# Patient Record
Sex: FEMALE | Race: WHITE | NOT HISPANIC OR LATINO | URBAN - METROPOLITAN AREA
[De-identification: names, ages, dates, MRNs, and addresses within clinical notes are randomized per-mention and may not be internally consistent; named-entity substitution may affect disease eponyms.]

---

## 2023-10-05 ENCOUNTER — INPATIENT (INPATIENT)
Facility: HOSPITAL | Age: 33
LOS: 2 days | Discharge: HOME | DRG: 694 | End: 2023-10-08
Attending: HOSPITALIST | Admitting: STUDENT IN AN ORGANIZED HEALTH CARE EDUCATION/TRAINING PROGRAM
Payer: MEDICAID

## 2023-10-05 VITALS
SYSTOLIC BLOOD PRESSURE: 126 MMHG | HEART RATE: 80 BPM | OXYGEN SATURATION: 97 % | DIASTOLIC BLOOD PRESSURE: 89 MMHG | TEMPERATURE: 98 F | RESPIRATION RATE: 16 BRPM | HEIGHT: 65 IN | WEIGHT: 160.06 LBS

## 2023-10-05 DIAGNOSIS — Z90.3 ACQUIRED ABSENCE OF STOMACH [PART OF]: Chronic | ICD-10-CM

## 2023-10-05 LAB
ALBUMIN SERPL ELPH-MCNC: 4.4 G/DL — SIGNIFICANT CHANGE UP (ref 3.3–5)
ALP SERPL-CCNC: 56 U/L — SIGNIFICANT CHANGE UP (ref 40–120)
ALT FLD-CCNC: 14 U/L — SIGNIFICANT CHANGE UP (ref 10–45)
ANION GAP SERPL CALC-SCNC: 25 MMOL/L — HIGH (ref 5–17)
ANION GAP SERPL CALC-SCNC: 27 MMOL/L — HIGH (ref 5–17)
AST SERPL-CCNC: 28 U/L — SIGNIFICANT CHANGE UP (ref 10–40)
BASE EXCESS BLDV CALC-SCNC: -1.5 MMOL/L — SIGNIFICANT CHANGE UP (ref -2–3)
BASOPHILS # BLD AUTO: 0.07 K/UL — SIGNIFICANT CHANGE UP (ref 0–0.2)
BASOPHILS NFR BLD AUTO: 0.9 % — SIGNIFICANT CHANGE UP (ref 0–2)
BILIRUB SERPL-MCNC: 0.6 MG/DL — SIGNIFICANT CHANGE UP (ref 0.2–1.2)
BUN SERPL-MCNC: 13 MG/DL — SIGNIFICANT CHANGE UP (ref 7–23)
BUN SERPL-MCNC: 14 MG/DL — SIGNIFICANT CHANGE UP (ref 7–23)
CA-I SERPL-SCNC: 1.1 MMOL/L — LOW (ref 1.15–1.33)
CALCIUM SERPL-MCNC: 10.1 MG/DL — SIGNIFICANT CHANGE UP (ref 8.4–10.5)
CALCIUM SERPL-MCNC: 9.9 MG/DL — SIGNIFICANT CHANGE UP (ref 8.4–10.5)
CHLORIDE BLDV-SCNC: 95 MMOL/L — LOW (ref 96–108)
CHLORIDE SERPL-SCNC: 95 MMOL/L — LOW (ref 96–108)
CHLORIDE SERPL-SCNC: 96 MMOL/L — SIGNIFICANT CHANGE UP (ref 96–108)
CO2 BLDV-SCNC: 21 MMOL/L — LOW (ref 22–26)
CO2 SERPL-SCNC: 17 MMOL/L — LOW (ref 22–31)
CREAT SERPL-MCNC: 0.74 MG/DL — SIGNIFICANT CHANGE UP (ref 0.5–1.3)
CREAT SERPL-MCNC: 1 MG/DL — SIGNIFICANT CHANGE UP (ref 0.5–1.3)
EGFR: 111 ML/MIN/1.73M2 — SIGNIFICANT CHANGE UP
EGFR: 77 ML/MIN/1.73M2 — SIGNIFICANT CHANGE UP
EOSINOPHIL # BLD AUTO: 0.01 K/UL — SIGNIFICANT CHANGE UP (ref 0–0.5)
EOSINOPHIL NFR BLD AUTO: 0.1 % — SIGNIFICANT CHANGE UP (ref 0–6)
GAS PNL BLDV: 134 MMOL/L — LOW (ref 136–145)
GAS PNL BLDV: SIGNIFICANT CHANGE UP
GLUCOSE BLDV-MCNC: 128 MG/DL — HIGH (ref 70–99)
GLUCOSE SERPL-MCNC: 120 MG/DL — HIGH (ref 70–99)
GLUCOSE SERPL-MCNC: 129 MG/DL — HIGH (ref 70–99)
HCG SERPL-ACNC: <2 MIU/ML — SIGNIFICANT CHANGE UP
HCO3 BLDV-SCNC: 20 MMOL/L — LOW (ref 22–29)
HCT VFR BLD CALC: 40.1 % — SIGNIFICANT CHANGE UP (ref 34.5–45)
HCT VFR BLDA CALC: 42 % — SIGNIFICANT CHANGE UP (ref 34.5–46.5)
HGB BLD CALC-MCNC: 14 G/DL — SIGNIFICANT CHANGE UP (ref 11.7–16.1)
HGB BLD-MCNC: 13.8 G/DL — SIGNIFICANT CHANGE UP (ref 11.5–15.5)
IMM GRANULOCYTES NFR BLD AUTO: 0.5 % — SIGNIFICANT CHANGE UP (ref 0–0.9)
LACTATE BLDV-MCNC: 2.2 MMOL/L — HIGH (ref 0.5–2)
LIDOCAIN IGE QN: 49 U/L — SIGNIFICANT CHANGE UP (ref 7–60)
LYMPHOCYTES # BLD AUTO: 0.69 K/UL — LOW (ref 1–3.3)
LYMPHOCYTES # BLD AUTO: 9 % — LOW (ref 13–44)
MCHC RBC-ENTMCNC: 30.4 PG — SIGNIFICANT CHANGE UP (ref 27–34)
MCHC RBC-ENTMCNC: 34.4 GM/DL — SIGNIFICANT CHANGE UP (ref 32–36)
MCV RBC AUTO: 88.3 FL — SIGNIFICANT CHANGE UP (ref 80–100)
MONOCYTES # BLD AUTO: 0.4 K/UL — SIGNIFICANT CHANGE UP (ref 0–0.9)
MONOCYTES NFR BLD AUTO: 5.2 % — SIGNIFICANT CHANGE UP (ref 2–14)
NEUTROPHILS # BLD AUTO: 6.47 K/UL — SIGNIFICANT CHANGE UP (ref 1.8–7.4)
NEUTROPHILS NFR BLD AUTO: 84.3 % — HIGH (ref 43–77)
NRBC # BLD: 0 /100 WBCS — SIGNIFICANT CHANGE UP (ref 0–0)
PCO2 BLDV: 25 MMHG — LOW (ref 39–42)
PH BLDV: 7.51 — HIGH (ref 7.32–7.43)
PLATELET # BLD AUTO: 277 K/UL — SIGNIFICANT CHANGE UP (ref 150–400)
PO2 BLDV: 36 MMHG — SIGNIFICANT CHANGE UP (ref 25–45)
POTASSIUM BLDV-SCNC: 4.3 MMOL/L — SIGNIFICANT CHANGE UP (ref 3.5–5.1)
POTASSIUM SERPL-MCNC: 3 MMOL/L — LOW (ref 3.5–5.3)
POTASSIUM SERPL-MCNC: 3.4 MMOL/L — LOW (ref 3.5–5.3)
POTASSIUM SERPL-SCNC: 3 MMOL/L — LOW (ref 3.5–5.3)
POTASSIUM SERPL-SCNC: 3.4 MMOL/L — LOW (ref 3.5–5.3)
PROT SERPL-MCNC: 6.9 G/DL — SIGNIFICANT CHANGE UP (ref 6–8.3)
RBC # BLD: 4.54 M/UL — SIGNIFICANT CHANGE UP (ref 3.8–5.2)
RBC # FLD: 13.1 % — SIGNIFICANT CHANGE UP (ref 10.3–14.5)
SAO2 % BLDV: 66.9 % — LOW (ref 67–88)
SODIUM SERPL-SCNC: 138 MMOL/L — SIGNIFICANT CHANGE UP (ref 135–145)
SODIUM SERPL-SCNC: 139 MMOL/L — SIGNIFICANT CHANGE UP (ref 135–145)
WBC # BLD: 7.68 K/UL — SIGNIFICANT CHANGE UP (ref 3.8–10.5)
WBC # FLD AUTO: 7.68 K/UL — SIGNIFICANT CHANGE UP (ref 3.8–10.5)

## 2023-10-05 PROCEDURE — 99285 EMERGENCY DEPT VISIT HI MDM: CPT

## 2023-10-05 PROCEDURE — 74177 CT ABD & PELVIS W/CONTRAST: CPT | Mod: 26,MA

## 2023-10-05 RX ORDER — ONDANSETRON 8 MG/1
4 TABLET, FILM COATED ORAL ONCE
Refills: 0 | Status: COMPLETED | OUTPATIENT
Start: 2023-10-05 | End: 2023-10-05

## 2023-10-05 RX ORDER — THIAMINE MONONITRATE (VIT B1) 100 MG
100 TABLET ORAL ONCE
Refills: 0 | Status: COMPLETED | OUTPATIENT
Start: 2023-10-05 | End: 2023-10-05

## 2023-10-05 RX ORDER — HYDROMORPHONE HYDROCHLORIDE 2 MG/ML
1 INJECTION INTRAMUSCULAR; INTRAVENOUS; SUBCUTANEOUS ONCE
Refills: 0 | Status: DISCONTINUED | OUTPATIENT
Start: 2023-10-05 | End: 2023-10-05

## 2023-10-05 RX ORDER — KETOROLAC TROMETHAMINE 30 MG/ML
15 SYRINGE (ML) INJECTION ONCE
Refills: 0 | Status: DISCONTINUED | OUTPATIENT
Start: 2023-10-05 | End: 2023-10-05

## 2023-10-05 RX ORDER — SODIUM CHLORIDE 9 MG/ML
1000 INJECTION, SOLUTION INTRAVENOUS
Refills: 0 | Status: DISCONTINUED | OUTPATIENT
Start: 2023-10-05 | End: 2023-10-08

## 2023-10-05 RX ORDER — SODIUM CHLORIDE 9 MG/ML
1000 INJECTION, SOLUTION INTRAVENOUS ONCE
Refills: 0 | Status: COMPLETED | OUTPATIENT
Start: 2023-10-05 | End: 2023-10-05

## 2023-10-05 RX ORDER — DEXTROSE 50 % IN WATER 50 %
25 SYRINGE (ML) INTRAVENOUS ONCE
Refills: 0 | Status: COMPLETED | OUTPATIENT
Start: 2023-10-05 | End: 2023-10-05

## 2023-10-05 RX ORDER — FOLIC ACID 0.8 MG
1 TABLET ORAL ONCE
Refills: 0 | Status: COMPLETED | OUTPATIENT
Start: 2023-10-05 | End: 2023-10-05

## 2023-10-05 RX ORDER — ACETAMINOPHEN 500 MG
1000 TABLET ORAL ONCE
Refills: 0 | Status: COMPLETED | OUTPATIENT
Start: 2023-10-05 | End: 2023-10-05

## 2023-10-05 RX ORDER — POTASSIUM CHLORIDE 20 MEQ
40 PACKET (EA) ORAL ONCE
Refills: 0 | Status: COMPLETED | OUTPATIENT
Start: 2023-10-05 | End: 2023-10-05

## 2023-10-05 RX ADMIN — HYDROMORPHONE HYDROCHLORIDE 1 MILLIGRAM(S): 2 INJECTION INTRAMUSCULAR; INTRAVENOUS; SUBCUTANEOUS at 22:22

## 2023-10-05 RX ADMIN — SODIUM CHLORIDE 4000 MILLILITER(S): 9 INJECTION, SOLUTION INTRAVENOUS at 21:14

## 2023-10-05 RX ADMIN — Medication 15 MILLIGRAM(S): at 21:11

## 2023-10-05 RX ADMIN — Medication 15 MILLIGRAM(S): at 21:04

## 2023-10-05 RX ADMIN — ONDANSETRON 4 MILLIGRAM(S): 8 TABLET, FILM COATED ORAL at 21:04

## 2023-10-05 RX ADMIN — ONDANSETRON 4 MILLIGRAM(S): 8 TABLET, FILM COATED ORAL at 17:43

## 2023-10-05 RX ADMIN — Medication 1000 MILLIGRAM(S): at 21:10

## 2023-10-05 RX ADMIN — Medication 100 MILLIGRAM(S): at 21:14

## 2023-10-05 RX ADMIN — Medication 1 MILLIGRAM(S): at 22:00

## 2023-10-05 RX ADMIN — Medication 400 MILLIGRAM(S): at 21:04

## 2023-10-05 RX ADMIN — HYDROMORPHONE HYDROCHLORIDE 1 MILLIGRAM(S): 2 INJECTION INTRAMUSCULAR; INTRAVENOUS; SUBCUTANEOUS at 23:11

## 2023-10-05 RX ADMIN — HYDROMORPHONE HYDROCHLORIDE 1 MILLIGRAM(S): 2 INJECTION INTRAMUSCULAR; INTRAVENOUS; SUBCUTANEOUS at 21:20

## 2023-10-05 RX ADMIN — HYDROMORPHONE HYDROCHLORIDE 1 MILLIGRAM(S): 2 INJECTION INTRAMUSCULAR; INTRAVENOUS; SUBCUTANEOUS at 21:41

## 2023-10-05 NOTE — ED ADULT TRIAGE NOTE - CHIEF COMPLAINT QUOTE
5 weeks had gastric sleeve and has left lower adam pain radiated to the abd area decreased urination

## 2023-10-05 NOTE — ED PROVIDER NOTE - OBJECTIVE STATEMENT
32-year-old female with past medical history of gastric sleeve 5 weeks ago at [place] presenting with left-sided abdominal pain  onset today.   Patient endorses some hematuria and this setting of current menstruation.  Small amounts NBNB emesis. No prior history of nephrolithiasis, no dysuria, no fevers and no chills.    NKDA 32-year-old female with past medical history of gastric sleeve 5 weeks ago at [place] presenting with left-sided abdominal pain  onset today.   Patient endorses some hematuria and this setting of current menstruation.  Small amounts NBNB emesis. No prior history of nephrolithiasis, no dysuria, no fevers and no chills.    NKDA 32-year-old female with past medical history of gastric sleeve 5 weeks ago at [place] presenting with left-sided abdominal pain  onset today.   Patient endorses some hematuria and this setting of current menstruation.  Small amounts NBNB emesis. No prior history of nephrolithiasis, no dysuria, no fevers and no chills.    NKDA

## 2023-10-05 NOTE — ED PROVIDER NOTE - RAPID ASSESSMENT
32-year-old female with past medical history of gastric sleeve 5 weeks ago at [place] presenting with abdominal pain.  Patient reports that she has had intermittent abdominal discomfort since the procedure and has not been tolerating p.o. well.  But over the past few days patient has developed severe left lower back pain radiating around to left lower quadrant and periumbilical area.  Abdominal pain has been associated with nausea and vomiting.  Patient unable to tolerate p.o.  Patient contacted her surgeon who instructed her to go to the ER.    **Patient was rapidly assessed by me, Terence Harp PA-C. A limited history was obtained. The patient will be seen and further examined/worked up in the main ED and their care will be completed by the main ED team. Receiving team will follow up on labs, analgesia, any clinical imaging, and perform reassessment and disposition of the patient as clinically indicated. All decisions regarding the progression of care will be made at their discretion. 32-year-old female with past medical history of gastric sleeve 5 weeks ago at [place] presenting with abdominal pain.  Patient reports that she has had intermittent abdominal discomfort since the procedure and has not been tolerating p.o. well.  But over the past few days patient has developed severe left lower back pain radiating around to left lower quadrant and periumbilical area.  Abdominal pain has been associated with nausea and vomiting.  Patient unable to tolerate p.o.  Patient contacted her surgeon who instructed her to go to the ER.    **Patient was rapidly assessed by me, Terence Harp PA-C. A limited history was obtained. The patient will be seen and further examined/worked up in the main ED and their care will be completed by the main ED team. Receiving team will follow up on labs, analgesia, any clinical imaging, and perform reassessment and disposition of the patient as clinically indicated. All decisions regarding the progression of care will be made at their discretion. 32-year-old female with past medical history of gastric sleeve 5 weeks ago at [place] presenting with abdominal pain.  Patient reports that she has had intermittent abdominal discomfort since the procedure and has not been tolerating p.o. well.  But over the past few days patient has developed severe left lower back pain radiating around to left lower quadrant and periumbilical area.  Abdominal pain has been associated with nausea and vomiting.  Patient unable to tolerate p.o.  Patient contacted her surgeon who instructed her to go to the ER.    **Patient was rapidly assessed by me, Terence Harp PA-C. A limited history was obtained. The patient will be seen and further examined/worked up in the main ED and their care will be completed by the main ED team. Receiving team will follow up on labs, analgesia, any clinical imaging, and perform reassessment and disposition of the patient as clinically indicated. All decisions regarding the progression of care will be made at their discretion. 32-year-old female with past medical history of gastric sleeve 5 weeks ago at [place] presenting with abdominal pain.  Patient reports that she has had intermittent abdominal discomfort since the procedure and has not been tolerating p.o. well.  But over the past few days patient has developed severe left lower back pain radiating around to left lower quadrant and periumbilical area.  Abdominal pain has been associated with nausea and vomiting.  Patient unable to tolerate p.o.  Patient contacted her surgeon who instructed her to go to the ER.    **Patient was rapidly assessed by me, Terence Harp PA-C. A limited history was obtained. The patient will be seen and further examined/worked up in the main ED and their care will be completed by the main ED team. Receiving team will follow up on labs, analgesia, any clinical imaging, and perform reassessment and disposition of the patient as clinically indicated. All decisions regarding the progression of care will be made at their discretion.    Attending MD Hernandez: This patient was seen and orders were placed by the PA as per our department's QPA model.  I was not consulted in regards to this patient although I was present and available in the Emergency Department to the PA.  Patient was to be sent to main ED for full medical evaluation and receiving team was to follow up on any labs, analgesia, clinical imaging ordered by the PA.  Any reassessment and disposition decisions were to be made by receiving team as clinically indicated, all decisions regarding the progression of care to be made at their discretion.  I did not perform a comprehensive history and physical on this patient. 32-year-old female with past medical history of gastric sleeve 5 weeks ago at [place] presenting with abdominal pain.  Patient reports that she has had intermittent abdominal discomfort since the procedure and has not been tolerating p.o. well.  But over the past few days patient has developed severe left lower back pain radiating around to left lower quadrant and periumbilical area.  Abdominal pain has been associated with nausea and vomiting.  Patient unable to tolerate p.o.  Patient contacted her surgeon who instructed her to go to the ER.    **Patient was rapidly assessed by me, Terence Harp PA-C. A limited history was obtained. The patient will be seen and further examined/worked up in the main ED and their care will be completed by the main ED team. Receiving team will follow up on labs, analgesia, any clinical imaging, and perform reassessment and disposition of the patient as clinically indicated. All decisions regarding the progression of care will be made at their discretion.    Attending MD Hernandez: This patient was seen and orders were placed by the PA as per our department's QPA model.  I was not consulted in regards to this patient although I was present and available in the Emergency Department to the PA.  Patient was to be sent to main ED for full medical evaluation and receiving team was to follow up on any labs, analgesia, clinical imaging ordered by the PA.  Any reassessment and disposition decisions were to be made by receiving team as clinically indicated, all decisions regarding the progression of care to be made at their discretion.  I did not perform a comprehensive history and physical on this patient. 32-year-old female with past medical history of gastric sleeve 5 weeks ago at [place] presenting with abdominal pain.  Patient reports that she has had intermittent abdominal discomfort since the procedure and has not been tolerating p.o. well.  But over the past few days patient has developed severe left lower back pain radiating around to left lower quadrant and periumbilical area.  Abdominal pain has been associated with nausea and vomiting.  Patient unable to tolerate p.o.  Patient contacted her surgeon who instructed her to go to the ER.    **Patient was rapidly assessed by me, Terence Harp PA-C. A limited history was obtained. The patient will be seen and further examined/worked up in the main ED and their care will be completed by the main ED team. Receiving team will follow up on labs, analgesia, any clinical imaging, and perform reassessment and disposition of the patient as clinically indicated. All decisions regarding the progression of care will be made at their discretion.    Attending MD Hernandez: This patient was seen and orders were placed by the PA as per our department's QPA model.  I was not consulted in regards to this patient although I was present and available in the Emergency Department to the PA.  Patient was to be sent to main ED for full medical evaluation and receiving team was to follow up on any labs, analgesia, clinical imaging ordered by the PA.  Any reassessment and disposition decisions were to be made by receiving team as clinically indicated, all decisions regarding the progression of care to be made at their discretion.  I did not perform a comprehensive history and physical on this patient.

## 2023-10-05 NOTE — CONSULT NOTE ADULT - SUBJECTIVE AND OBJECTIVE BOX
HISTORY OF PRESENT ILLNESS:  HPI: 32-year-old female with past medical history of gastric sleeve 5 weeks ago at [place] presenting with abdominal pain. Patient states she developed severe left lower back pain radiating around to left lower quadrant and periumbilical area, starting a few days ago. Pt endorses associated nausea and vomiting. Patient contacted her Bariatric surgeon who recommended she come to the ED for evaluation. Patient denies fever, chills, chest pain, SOB, dysuria. Patient has had decreased PO intake since the procedure.     In the ED, patient AF, HDS. Labs WNL. Bariatric surgery consulted for evaluation, given history of recent bariatric surgery.     PAST MEDICAL HISTORY: No pertinent past medical history    PAST SURGICAL HISTORY: H/O gastric sleeve    ALLERGIES: No Known Allergies      VITAL SIGNS:  T(C): 36.9 (05 Oct 2023 17:15), Max: 36.9 (05 Oct 2023 17:15)  T(F): 98.4 (05 Oct 2023 17:15), Max: 98.4 (05 Oct 2023 17:15)  HR: 80 (05 Oct 2023 17:15) (80 - 80)  BP: 126/89 (05 Oct 2023 17:15) (126/89 - 126/89)  BP(mean): --  ABP: --  ABP(mean): --  RR: 16 (05 Oct 2023 17:15) (16 - 16)  SpO2: 97% (05 Oct 2023 17:15) (97% - 97%)    O2 Parameters below as of 05 Oct 2023 17:15  Patient On (Oxygen Delivery Method): room air      PHYSICAL EXAM:  Gen: NAD  Neuro: A&Ox3  Resp: no increased WOB, satting well on RA  Cardiac: appears well perfused, extremities warm  Abd: soft, nondistended, incisions c/d/i   Back: L sided flank pain, tender to percussion     LABS:                         13.8   7.68  )-----------( 277      ( 05 Oct 2023 17:57 )             40.1     10-05    138  |  96  |  13  ----------------------------<  120<H>  3.0<L>   |  17<L>  |  1.00    Ca    9.9      05 Oct 2023 21:04    TPro  6.9  /  Alb  4.4  /  TBili  0.6  /  DBili  x   /  AST  28  /  ALT  14  /  AlkPhos  56  10-05      CAPILLARY BLOOD GLUCOSE        IMAGING:   < from: CT Abdomen and Pelvis w/ IV Cont (10.05.23 @ 20:12) >  FINDINGS:  LOWER CHEST: Within normal limits.    LIVER: Probable focal fat adjacent to the falciform ligament.  BILE DUCTS: Normal caliber.  GALLBLADDER: Within normal limits.  SPLEEN: Within normal limits.  PANCREAS: Within normal limits.  ADRENALS: Within normal limits.  KIDNEYS/URETERS: Moderate left hydroureteronephrosis with 3 mm stone at   the left ureterovesicular junction. Delayed enhancement of the left   kidney. No right hydronephrosis.    BLADDER: Within normal limits.  REPRODUCTIVE ORGANS: Uterus and adnexa within normal limits.    BOWEL: Small hiatal hernia. Sleeve gastrectomy. No bowel obstruction.   Appendix is normal.  PERITONEUM: No ascites.  VESSELS: Within normal limits.  RETROPERITONEUM/LYMPH NODES: No lymphadenopathy.  ABDOMINAL WALL: Within normal limits.  BONES: Within normal limits.    IMPRESSION:  Moderate left hydroureteronephrosis due to a 3 mm obstructing stone at   the left ureterovesicular junction.    < end of copied text >   HISTORY OF PRESENT ILLNESS:  HPI: 32-year-old female with past medical history of gastric sleeve 5 weeks ago at [place] presenting with abdominal pain. Patient states she developed severe left lower back pain radiating around to left lower quadrant and periumbilical area, starting a few days ago. Pt endorses associated nausea and vomiting. Patient contacted her Bariatric surgeon who recommended she come to the ED for evaluation. Patient denies fever, chills, chest pain, SOB, dysuria. Patient has had decreased PO intake since the procedure.     In the ED, patient AF, HDS. Labs WNL. Bariatric surgery consulted for evaluation, given history of recent bariatric surgery.     PAST MEDICAL HISTORY: No pertinent past medical history    PAST SURGICAL HISTORY: H/O gastric sleeve    ALLERGIES: No Known Allergies      VITAL SIGNS:  T(C): 36.9 (05 Oct 2023 17:15), Max: 36.9 (05 Oct 2023 17:15)  T(F): 98.4 (05 Oct 2023 17:15), Max: 98.4 (05 Oct 2023 17:15)  HR: 80 (05 Oct 2023 17:15) (80 - 80)  BP: 126/89 (05 Oct 2023 17:15) (126/89 - 126/89)  BP(mean): --  ABP: --  ABP(mean): --  RR: 16 (05 Oct 2023 17:15) (16 - 16)  SpO2: 97% (05 Oct 2023 17:15) (97% - 97%)    O2 Parameters below as of 05 Oct 2023 17:15  Patient On (Oxygen Delivery Method): room air      PHYSICAL EXAM:  Gen: NAD  Neuro: A&Ox3  Resp: no increased WOB, satting well on RA  Cardiac: appears well perfused, extremities warm  Abd: soft, nondistended, incisions c/d/i   Back: L sided flank pain, tender to percussion     LABS:                         13.8   7.68  )-----------( 277      ( 05 Oct 2023 17:57 )             40.1     10-05    138  |  96  |  13  ----------------------------<  120<H>  3.0<L>   |  17<L>  |  1.00    Ca    9.9      05 Oct 2023 21:04    TPro  6.9  /  Alb  4.4  /  TBili  0.6  /  DBili  x   /  AST  28  /  ALT  14  /  AlkPhos  56  10-05      CAPILLARY BLOOD GLUCOSE        IMAGING:   < from: CT Abdomen and Pelvis w/ IV Cont (10.05.23 @ 20:12) >  FINDINGS:  LOWER CHEST: Within normal limits.    LIVER: Probable focal fat adjacent to the falciform ligament.  BILE DUCTS: Normal caliber.  GALLBLADDER: Within normal limits.  SPLEEN: Within normal limits.  PANCREAS: Within normal limits.  ADRENALS: Within normal limits.  KIDNEYS/URETERS: Moderate left hydroureteronephrosis with 3 mm stone at   the left ureterovesicular junction. Delayed enhancement of the left   kidney. No right hydronephrosis.    BLADDER: Within normal limits.  REPRODUCTIVE ORGANS: Uterus and adnexa within normal limits.    BOWEL: Small hiatal hernia. Sleeve gastrectomy. No bowel obstruction.   Appendix is normal.  PERITONEUM: No ascites.  VESSELS: Within normal limits.  RETROPERITONEUM/LYMPH NODES: No lymphadenopathy.  ABDOMINAL WALL: Within normal limits.  BONES: Within normal limits.    IMPRESSION:  Moderate left hydroureteronephrosis due to a 3 mm obstructing stone at   the left ureterovesicular junction.    < end of copied text >   HISTORY OF PRESENT ILLNESS:  HPI: 32-year-old female with past medical history of gastric sleeve 5 weeks ago at [place] presenting with abdominal pain. Patient states she developed severe left lower back pain radiating around to left lower quadrant and periumbilical area, starting a few days ago. Pt endorses associated nausea and vomiting. Patient contacted her Bariatric surgeon who recommended she come to the ED for evaluation. Patient denies fever, chills, chest pain, SOB, dysuria. Patient has had decreased PO intake since the procedure.     In the ED, patient AF, HDS. Labs WNL. Bariatric surgery consulted for evaluation, given history of recent bariatric surgery.     PAST MEDICAL HISTORY: No pertinent past medical history    PAST SURGICAL HISTORY: H/O gastric sleeve    ALLERGIES: No Known Allergies      VITAL SIGNS:  T(C): 36.9 (05 Oct 2023 17:15), Max: 36.9 (05 Oct 2023 17:15)  T(F): 98.4 (05 Oct 2023 17:15), Max: 98.4 (05 Oct 2023 17:15)  HR: 80 (05 Oct 2023 17:15) (80 - 80)  BP: 126/89 (05 Oct 2023 17:15) (126/89 - 126/89)  BP(mean): --  ABP: --  ABP(mean): --  RR: 16 (05 Oct 2023 17:15) (16 - 16)  SpO2: 97% (05 Oct 2023 17:15) (97% - 97%)    O2 Parameters below as of 05 Oct 2023 17:15  Patient On (Oxygen Delivery Method): room air      PHYSICAL EXAM:  Gen: NAD  Neuro: A&Ox3  Resp: no increased WOB, satting well on RA  Cardiac: appears well perfused, extremities warm  Abd: soft, nondistended, incisions c/d/i   Back: L sided flank pain, tender to percussion     LABS:                         13.8   7.68  )-----------( 277      ( 05 Oct 2023 17:57 )             40.1     10-05    138  |  96  |  13  ----------------------------<  120<H>  3.0<L>   |  17<L>  |  1.00    Ca    9.9      05 Oct 2023 21:04    TPro  6.9  /  Alb  4.4  /  TBili  0.6  /  DBili  x   /  AST  28  /  ALT  14  /  AlkPhos  56  10-05      CAPILLARY BLOOD GLUCOSE        IMAGING:   < from: CT Abdomen and Pelvis w/ IV Cont (10.05.23 @ 20:12) >  FINDINGS:  LOWER CHEST: Within normal limits.    LIVER: Probable focal fat adjacent to the falciform ligament.  BILE DUCTS: Normal caliber.  GALLBLADDER: Within normal limits.  SPLEEN: Within normal limits.  PANCREAS: Within normal limits.  ADRENALS: Within normal limits.  KIDNEYS/URETERS: Moderate left hydroureteronephrosis with 3 mm stone at   the left ureterovesicular junction. Delayed enhancement of the left   kidney. No right hydronephrosis.    BLADDER: Within normal limits.  REPRODUCTIVE ORGANS: Uterus and adnexa within normal limits.    BOWEL: Small hiatal hernia. Sleeve gastrectomy. No bowel obstruction.   Appendix is normal.  PERITONEUM: No ascites.  VESSELS: Within normal limits.  RETROPERITONEUM/LYMPH NODES: No lymphadenopathy.  ABDOMINAL WALL: Within normal limits.  BONES: Within normal limits.    IMPRESSION:  Moderate left hydroureteronephrosis due to a 3 mm obstructing stone at   the left ureterovesicular junction.    < end of copied text >

## 2023-10-05 NOTE — ED PROVIDER NOTE - PHYSICAL EXAMINATION
Gen: uncomfortable, curled on bed  Head: NCAT  Eyes: EOMI, PERRLA, no conjunctival pallor, no scleral icterus  ENT:  +MM dry  Neck: neck supple  Resp: CTAB, no W/R/R  CV: RRR, +S1/S2, no M/R/G  GI: Abdomen soft non-distended, +L ttp and CVAT  MSK: No open wounds, no bruising, no lower extremity edema  Neuro: A&Ox4, sensation nl, motor 5/5 RUE/LUE/RLE/LLE, follows commands  Ext: no edema, no deformity, warm and well-perfused  Skin: no rash or bruising

## 2023-10-05 NOTE — CONSULT NOTE ADULT - ASSESSMENT
ASSESSMENT: 32-year-old female with past medical history of gastric sleeve 5 weeks ago at [place] presenting with abdominal pain. CT demonstrating moderate left hydroureteronephrosis due to a 3 mm obstructing stone at   the left ureterovesicular junction.    RECS:  -No surgical intervention   -Pain likely 2/2 nephrolithiasis   -Recommend hydration with banana bag   -Patient should follow up with her surgeon outpatient   -Discussed with fellow, Dr. Ovi Chiu Team, [id number]  ASSESSMENT: 32-year-old female with past medical history of gastric sleeve 5 weeks ago at [place] presenting with abdominal pain. CT demonstrating moderate left hydroureteronephrosis due to a 3 mm obstructing stone at   the left ureterovesicular junction.    RECS:  -No surgical intervention   -Pain likely 2/2 nephrolithiasis   -Recommend hydration with banana bag   -Patient should follow up with her surgeon outpatient   -Discussed with fellow, Dr. Ovi Chiu Team, [phone]  ASSESSMENT: 32-year-old female with past medical history of gastric sleeve 5 weeks ago at [place] presenting with abdominal pain. CT demonstrating moderate left hydroureteronephrosis due to a 3 mm obstructing stone at   the left ureterovesicular junction.    RECS:  -No surgical intervention   -Pain likely 2/2 nephrolithiasis   -Recommend hydration with banana bag   -Patient should follow up with her surgeon outpatient   -Discussed with fellow, Dr. Ovi Chiu Team, [id number]

## 2023-10-05 NOTE — ED PROVIDER NOTE - ATTENDING CONTRIBUTION TO CARE
------------ATTENDING NOTE------------  pt w/ sister c/o 24 hrs waxing/waning mild to severe stabbing pain in L flank w/ nausea and small amts nbnb vomiting, no fevers, no change in BM/stools, c/w triage CT showing L distal ureteral stone, awaiting UA to eval infectious process, complicated as 6 wk s/p gastric sleeve, awaiting symptom control, full labs, Bariatrics consult, close reassessments -->  - Moiz Bustamante MD   ------------------------------------------------ ------------ATTENDING NOTE------------  pt w/ sister c/o 24 hrs waxing/waning mild to severe stabbing pain in L flank w/ nausea and small amts nbnb vomiting, no fevers, no change in BM/stools, c/w triage CT showing L distal ureteral stone, awaiting UA to eval infectious process, complicated as 6 wk s/p gastric sleeve, awaiting symptom control, full labs, Bariatrics consult, close reassessments --> improving w/ tx, ED sign out pending full Bariatrics recs, close reassessments for tx / dispo decisions.  - Moiz Bustamante MD   ------------------------------------------------

## 2023-10-05 NOTE — ED PROVIDER NOTE - NS ED MD DISPO DIVISION
[facility]

## 2023-10-05 NOTE — ED PROVIDER NOTE - PROGRESS NOTE DETAILS
Lb BURDICK PGY-3: bariatrics paged awaiting call back Lb BURDICK PGY-3: spoke with bariatrics, will jonathan pt. Pending CDU bed vs admission Lb BURDICK PGY-3: pt with uptrending Cr 0.74 -> 1.00 -> 1.10. spoke with urology who will come evaluate patient shortly. Attending (Donovan Marquis D.O.):  Patient signed out to me, hemodynam stable, hx of bariatric surg, could not tolerate PO so no PO contrast for CT, found to have obstructing renal stone with rising Cr. Uro consulted, rec med management but patient with multiple rounds of analgesia. Unlikely to be a candidate for CDU. No acute bariatric intervention. Admited.

## 2023-10-05 NOTE — ED PROVIDER NOTE - CARE PLAN
1 Principal Discharge DX:	Ureteral stone with hydronephrosis  Goal:	3mm Left distal ureteral stone  Secondary Diagnosis:	Moderate dehydration  Secondary Diagnosis:	Acute hypokalemia

## 2023-10-06 DIAGNOSIS — K59.00 CONSTIPATION, UNSPECIFIED: ICD-10-CM

## 2023-10-06 DIAGNOSIS — Z90.3 ACQUIRED ABSENCE OF STOMACH [PART OF]: ICD-10-CM

## 2023-10-06 DIAGNOSIS — R73.9 HYPERGLYCEMIA, UNSPECIFIED: ICD-10-CM

## 2023-10-06 DIAGNOSIS — N20.0 CALCULUS OF KIDNEY: ICD-10-CM

## 2023-10-06 DIAGNOSIS — N13.2 HYDRONEPHROSIS WITH RENAL AND URETERAL CALCULOUS OBSTRUCTION: ICD-10-CM

## 2023-10-06 LAB
A1C WITH ESTIMATED AVERAGE GLUCOSE RESULT: 5.2 % — SIGNIFICANT CHANGE UP (ref 4–5.6)
ANION GAP SERPL CALC-SCNC: 14 MMOL/L — SIGNIFICANT CHANGE UP (ref 5–17)
ANION GAP SERPL CALC-SCNC: 19 MMOL/L — HIGH (ref 5–17)
APPEARANCE UR: CLEAR — SIGNIFICANT CHANGE UP
BACTERIA # UR AUTO: ABNORMAL
BASE EXCESS BLDV CALC-SCNC: -0.8 MMOL/L — SIGNIFICANT CHANGE UP (ref -2–3)
BILIRUB UR-MCNC: ABNORMAL
BUN SERPL-MCNC: 11 MG/DL — SIGNIFICANT CHANGE UP (ref 7–23)
BUN SERPL-MCNC: 9 MG/DL — SIGNIFICANT CHANGE UP (ref 7–23)
CA-I SERPL-SCNC: 1.13 MMOL/L — LOW (ref 1.15–1.33)
CALCIUM SERPL-MCNC: 8.9 MG/DL — SIGNIFICANT CHANGE UP (ref 8.4–10.5)
CALCIUM SERPL-MCNC: 9 MG/DL — SIGNIFICANT CHANGE UP (ref 8.4–10.5)
CHLORIDE BLDV-SCNC: 96 MMOL/L — SIGNIFICANT CHANGE UP (ref 96–108)
CHLORIDE SERPL-SCNC: 100 MMOL/L — SIGNIFICANT CHANGE UP (ref 96–108)
CHLORIDE SERPL-SCNC: 97 MMOL/L — SIGNIFICANT CHANGE UP (ref 96–108)
CO2 BLDV-SCNC: 26 MMOL/L — SIGNIFICANT CHANGE UP (ref 22–26)
CO2 SERPL-SCNC: 22 MMOL/L — SIGNIFICANT CHANGE UP (ref 22–31)
CO2 SERPL-SCNC: 23 MMOL/L — SIGNIFICANT CHANGE UP (ref 22–31)
COLOR SPEC: YELLOW — SIGNIFICANT CHANGE UP
CREAT SERPL-MCNC: 1.09 MG/DL — SIGNIFICANT CHANGE UP (ref 0.5–1.3)
CREAT SERPL-MCNC: 1.1 MG/DL — SIGNIFICANT CHANGE UP (ref 0.5–1.3)
DIFF PNL FLD: NEGATIVE — SIGNIFICANT CHANGE UP
EGFR: 69 ML/MIN/1.73M2 — SIGNIFICANT CHANGE UP
EGFR: 70 ML/MIN/1.73M2 — SIGNIFICANT CHANGE UP
EPI CELLS # UR: 7 /HPF — HIGH
ESTIMATED AVERAGE GLUCOSE: 103 MG/DL — SIGNIFICANT CHANGE UP (ref 68–114)
GAS PNL BLDV: 133 MMOL/L — LOW (ref 136–145)
GAS PNL BLDV: SIGNIFICANT CHANGE UP
GLUCOSE BLDV-MCNC: 121 MG/DL — HIGH (ref 70–99)
GLUCOSE SERPL-MCNC: 123 MG/DL — HIGH (ref 70–99)
GLUCOSE SERPL-MCNC: 76 MG/DL — SIGNIFICANT CHANGE UP (ref 70–99)
GLUCOSE UR QL: ABNORMAL
HCO3 BLDV-SCNC: 25 MMOL/L — SIGNIFICANT CHANGE UP (ref 22–29)
HCT VFR BLD CALC: 33.4 % — LOW (ref 34.5–45)
HCT VFR BLDA CALC: 38 % — SIGNIFICANT CHANGE UP (ref 34.5–46.5)
HGB BLD CALC-MCNC: 12.6 G/DL — SIGNIFICANT CHANGE UP (ref 11.7–16.1)
HGB BLD-MCNC: 11.5 G/DL — SIGNIFICANT CHANGE UP (ref 11.5–15.5)
HYALINE CASTS # UR AUTO: 2 /LPF — SIGNIFICANT CHANGE UP (ref 0–2)
KETONES UR-MCNC: ABNORMAL
LACTATE BLDV-MCNC: 1.3 MMOL/L — SIGNIFICANT CHANGE UP (ref 0.5–2)
LEUKOCYTE ESTERASE UR-ACNC: NEGATIVE — SIGNIFICANT CHANGE UP
MAGNESIUM SERPL-MCNC: 1.7 MG/DL — SIGNIFICANT CHANGE UP (ref 1.6–2.6)
MCHC RBC-ENTMCNC: 31.1 PG — SIGNIFICANT CHANGE UP (ref 27–34)
MCHC RBC-ENTMCNC: 34.4 GM/DL — SIGNIFICANT CHANGE UP (ref 32–36)
MCV RBC AUTO: 90.3 FL — SIGNIFICANT CHANGE UP (ref 80–100)
NITRITE UR-MCNC: NEGATIVE — SIGNIFICANT CHANGE UP
NRBC # BLD: 0 /100 WBCS — SIGNIFICANT CHANGE UP (ref 0–0)
PCO2 BLDV: 44 MMHG — HIGH (ref 39–42)
PH BLDV: 7.36 — SIGNIFICANT CHANGE UP (ref 7.32–7.43)
PH UR: 6 — SIGNIFICANT CHANGE UP (ref 5–8)
PLATELET # BLD AUTO: 193 K/UL — SIGNIFICANT CHANGE UP (ref 150–400)
PO2 BLDV: 33 MMHG — SIGNIFICANT CHANGE UP (ref 25–45)
POTASSIUM BLDV-SCNC: 3.2 MMOL/L — LOW (ref 3.5–5.1)
POTASSIUM SERPL-MCNC: 3 MMOL/L — LOW (ref 3.5–5.3)
POTASSIUM SERPL-MCNC: 3.3 MMOL/L — LOW (ref 3.5–5.3)
POTASSIUM SERPL-SCNC: 3 MMOL/L — LOW (ref 3.5–5.3)
POTASSIUM SERPL-SCNC: 3.3 MMOL/L — LOW (ref 3.5–5.3)
PROT UR-MCNC: ABNORMAL
RBC # BLD: 3.7 M/UL — LOW (ref 3.8–5.2)
RBC # FLD: 13.3 % — SIGNIFICANT CHANGE UP (ref 10.3–14.5)
RBC CASTS # UR COMP ASSIST: 9 /HPF — HIGH (ref 0–4)
SAO2 % BLDV: 53.3 % — LOW (ref 67–88)
SODIUM SERPL-SCNC: 137 MMOL/L — SIGNIFICANT CHANGE UP (ref 135–145)
SODIUM SERPL-SCNC: 138 MMOL/L — SIGNIFICANT CHANGE UP (ref 135–145)
SP GR SPEC: >1.05 (ref 1.01–1.02)
UROBILINOGEN FLD QL: ABNORMAL
WBC # BLD: 8.94 K/UL — SIGNIFICANT CHANGE UP (ref 3.8–10.5)
WBC # FLD AUTO: 8.94 K/UL — SIGNIFICANT CHANGE UP (ref 3.8–10.5)
WBC UR QL: 3 /HPF — SIGNIFICANT CHANGE UP (ref 0–5)

## 2023-10-06 PROCEDURE — 99223 1ST HOSP IP/OBS HIGH 75: CPT

## 2023-10-06 RX ORDER — LANOLIN ALCOHOL/MO/W.PET/CERES
3 CREAM (GRAM) TOPICAL AT BEDTIME
Refills: 0 | Status: DISCONTINUED | OUTPATIENT
Start: 2023-10-06 | End: 2023-10-08

## 2023-10-06 RX ORDER — MORPHINE SULFATE 50 MG/1
4 CAPSULE, EXTENDED RELEASE ORAL EVERY 4 HOURS
Refills: 0 | Status: DISCONTINUED | OUTPATIENT
Start: 2023-10-06 | End: 2023-10-08

## 2023-10-06 RX ORDER — FAMOTIDINE 10 MG/ML
20 INJECTION INTRAVENOUS ONCE
Refills: 0 | Status: COMPLETED | OUTPATIENT
Start: 2023-10-06 | End: 2023-10-06

## 2023-10-06 RX ORDER — KETOROLAC TROMETHAMINE 30 MG/ML
30 SYRINGE (ML) INJECTION ONCE
Refills: 0 | Status: DISCONTINUED | OUTPATIENT
Start: 2023-10-06 | End: 2023-10-06

## 2023-10-06 RX ORDER — ONDANSETRON 8 MG/1
4 TABLET, FILM COATED ORAL EVERY 6 HOURS
Refills: 0 | Status: DISCONTINUED | OUTPATIENT
Start: 2023-10-06 | End: 2023-10-08

## 2023-10-06 RX ORDER — TAMSULOSIN HYDROCHLORIDE 0.4 MG/1
0.8 CAPSULE ORAL ONCE
Refills: 0 | Status: COMPLETED | OUTPATIENT
Start: 2023-10-06 | End: 2023-10-06

## 2023-10-06 RX ORDER — HYDROMORPHONE HYDROCHLORIDE 2 MG/ML
1 INJECTION INTRAMUSCULAR; INTRAVENOUS; SUBCUTANEOUS ONCE
Refills: 0 | Status: DISCONTINUED | OUTPATIENT
Start: 2023-10-06 | End: 2023-10-06

## 2023-10-06 RX ORDER — ONDANSETRON 8 MG/1
4 TABLET, FILM COATED ORAL ONCE
Refills: 0 | Status: COMPLETED | OUTPATIENT
Start: 2023-10-06 | End: 2023-10-06

## 2023-10-06 RX ORDER — NALOXONE HYDROCHLORIDE 4 MG/.1ML
0.4 SPRAY NASAL ONCE
Refills: 0 | Status: DISCONTINUED | OUTPATIENT
Start: 2023-10-06 | End: 2023-10-08

## 2023-10-06 RX ORDER — POTASSIUM CHLORIDE 20 MEQ
40 PACKET (EA) ORAL ONCE
Refills: 0 | Status: COMPLETED | OUTPATIENT
Start: 2023-10-06 | End: 2023-10-06

## 2023-10-06 RX ORDER — CHLORHEXIDINE GLUCONATE 213 G/1000ML
1 SOLUTION TOPICAL DAILY
Refills: 0 | Status: DISCONTINUED | OUTPATIENT
Start: 2023-10-06 | End: 2023-10-08

## 2023-10-06 RX ORDER — MORPHINE SULFATE 50 MG/1
2 CAPSULE, EXTENDED RELEASE ORAL EVERY 4 HOURS
Refills: 0 | Status: DISCONTINUED | OUTPATIENT
Start: 2023-10-06 | End: 2023-10-08

## 2023-10-06 RX ORDER — POTASSIUM CHLORIDE 20 MEQ
10 PACKET (EA) ORAL
Refills: 0 | Status: COMPLETED | OUTPATIENT
Start: 2023-10-06 | End: 2023-10-06

## 2023-10-06 RX ORDER — SENNA PLUS 8.6 MG/1
2 TABLET ORAL AT BEDTIME
Refills: 0 | Status: DISCONTINUED | OUTPATIENT
Start: 2023-10-06 | End: 2023-10-08

## 2023-10-06 RX ORDER — POLYETHYLENE GLYCOL 3350 17 G/17G
17 POWDER, FOR SOLUTION ORAL DAILY
Refills: 0 | Status: DISCONTINUED | OUTPATIENT
Start: 2023-10-06 | End: 2023-10-08

## 2023-10-06 RX ADMIN — SODIUM CHLORIDE 1000 MILLILITER(S): 9 INJECTION, SOLUTION INTRAVENOUS at 02:34

## 2023-10-06 RX ADMIN — TAMSULOSIN HYDROCHLORIDE 0.8 MILLIGRAM(S): 0.4 CAPSULE ORAL at 09:26

## 2023-10-06 RX ADMIN — HYDROMORPHONE HYDROCHLORIDE 1 MILLIGRAM(S): 2 INJECTION INTRAMUSCULAR; INTRAVENOUS; SUBCUTANEOUS at 04:24

## 2023-10-06 RX ADMIN — MORPHINE SULFATE 4 MILLIGRAM(S): 50 CAPSULE, EXTENDED RELEASE ORAL at 15:53

## 2023-10-06 RX ADMIN — Medication 100 MILLIEQUIVALENT(S): at 20:21

## 2023-10-06 RX ADMIN — Medication 100 MILLIEQUIVALENT(S): at 16:57

## 2023-10-06 RX ADMIN — Medication 40 MILLIEQUIVALENT(S): at 11:57

## 2023-10-06 RX ADMIN — MORPHINE SULFATE 4 MILLIGRAM(S): 50 CAPSULE, EXTENDED RELEASE ORAL at 16:23

## 2023-10-06 RX ADMIN — Medication 30 MILLILITER(S): at 04:23

## 2023-10-06 RX ADMIN — FAMOTIDINE 20 MILLIGRAM(S): 10 INJECTION INTRAVENOUS at 04:23

## 2023-10-06 RX ADMIN — Medication 25 GRAM(S): at 00:22

## 2023-10-06 RX ADMIN — SODIUM CHLORIDE 150 MILLILITER(S): 9 INJECTION, SOLUTION INTRAVENOUS at 15:53

## 2023-10-06 RX ADMIN — SODIUM CHLORIDE 150 MILLILITER(S): 9 INJECTION, SOLUTION INTRAVENOUS at 11:57

## 2023-10-06 RX ADMIN — SENNA PLUS 2 TABLET(S): 8.6 TABLET ORAL at 21:06

## 2023-10-06 RX ADMIN — Medication 30 MILLIGRAM(S): at 06:52

## 2023-10-06 RX ADMIN — Medication 100 MILLIEQUIVALENT(S): at 18:06

## 2023-10-06 RX ADMIN — SODIUM CHLORIDE 150 MILLILITER(S): 9 INJECTION, SOLUTION INTRAVENOUS at 16:57

## 2023-10-06 RX ADMIN — SODIUM CHLORIDE 150 MILLILITER(S): 9 INJECTION, SOLUTION INTRAVENOUS at 19:59

## 2023-10-06 RX ADMIN — ONDANSETRON 4 MILLIGRAM(S): 8 TABLET, FILM COATED ORAL at 11:55

## 2023-10-06 RX ADMIN — POLYETHYLENE GLYCOL 3350 17 GRAM(S): 17 POWDER, FOR SOLUTION ORAL at 11:56

## 2023-10-06 RX ADMIN — Medication 40 MILLIEQUIVALENT(S): at 00:18

## 2023-10-06 RX ADMIN — HYDROMORPHONE HYDROCHLORIDE 1 MILLIGRAM(S): 2 INJECTION INTRAMUSCULAR; INTRAVENOUS; SUBCUTANEOUS at 01:35

## 2023-10-06 RX ADMIN — MORPHINE SULFATE 2 MILLIGRAM(S): 50 CAPSULE, EXTENDED RELEASE ORAL at 11:56

## 2023-10-06 RX ADMIN — MORPHINE SULFATE 4 MILLIGRAM(S): 50 CAPSULE, EXTENDED RELEASE ORAL at 20:08

## 2023-10-06 NOTE — CONSULT NOTE ADULT - SUBJECTIVE AND OBJECTIVE BOX
HPI:  Patient is a 32y Female with gastric sleeve surgery surgery with [place] 5 weeks, HTN, and HLD who presents with left low back, left flank and bilateral lower abdominal pain.     She has been eating very little due to feeling nauseous and dry heaving whenever she eats since her procedure. She states she has had difficulty urinating. She is unsure of gross hematuria due to recent menses. Denies fever, vomiting, or dysuria.     Patient denies ever having a kidney stone in the past. There is a family history of kidney stones in her sister.     In the ER, she has been afebrile, and other vitals are WNL. WBC 7.7. Latest Cr is 1.10 in the ER, which has gradually increased since arrival from 0.74. U/A is negative for nitrites or leuk est; she has proteins, ketones and glucose in the urine. CT abd/pelv shows moderate left hydroureteronephrosis with an obstructing 3 mm stone at the left UVJ. She has been receiving Dilaudid in the ER, which was initially helping, but patient states she feels has pain.     PAST MEDICAL & SURGICAL HISTORY:  No pertinent past medical history      H/O gastric sleeve        FAMILY HISTORY:   No known  malignancy   SOCIAL HISTORY: Retired   Tobacco hx: smoker/nonsmoker   MEDICATIONS  (STANDING):  lactated ringers. 1000 milliLiter(s) (150 mL/Hr) IV Continuous <Continuous>    MEDICATIONS  (PRN):    Allergies    No Known Allergies    Intolerances        REVIEW OF SYSTEMS: Pertinent positives and negatives as stated in HPI, otherwise negative    Vital signs  T(C): 36.7 (10-06-23 @ 04:22), Max: 36.9 (10-05-23 @ 17:15)  HR: 72 (10-06-23 @ 04:22)  BP: 104/74 (10-06-23 @ 04:22)  SpO2: 97% (10-06-23 @ 04:22)  Wt(kg): --    Physical Exam  Gen: NAD  HEENT: normocephalic, atraumatic, no scleral icterus  Pulm: No respiratory distress, no subcostal retractions, no accessory muscle use   CV: No JVD  Abd: Soft, ND, no guarding. No CVA tenderness bilaterally.  : Bladder not palpable.  MSK:  Moving all extremities.  NEURO: A&Ox3  SKIN: warm, dry, no rash.    LABS:        10-06 @ 03:10    WBC --    / Hct --    / SCr 1.10     10-05 @ 21:04    WBC --    / Hct --    / SCr 1.00     10-06    138  |  97  |  11  ----------------------------<  123<H>  3.3<L>   |  22  |  1.10    Ca    9.0      06 Oct 2023 03:10    TPro  6.9  /  Alb  4.4  /  TBili  0.6  /  DBili  x   /  AST  28  /  ALT  14  /  AlkPhos  56  10-05      Urinalysis Basic - ( 06 Oct 2023 03:27 )    Color: Yellow / Appearance: Clear / SG: >1.050 / pH: x  Gluc: x / Ketone: Large  / Bili: Small / Urobili: 2 mg/dL   Blood: x / Protein: 30 mg/dL / Nitrite: Negative   Leuk Esterase: Negative / RBC: 9 /hpf / WBC 3 /HPF   Sq Epi: x / Non Sq Epi: x / Bacteria: Few      Radiology:   PROCEDURE DATE:  10/05/2023          INTERPRETATION:  CLINICAL INFORMATION: Periumbilical pain. Status post   sleeve gastrectomy.    COMPARISON: None.    CONTRAST/COMPLICATIONS:  IV Contrast: Omnipaque 350  90 cc administered   10 cc discarded  Oral Contrast: NONE  Complications: None reported at time of study completion    PROCEDURE:  CT of the Abdomen and Pelvis was performed.  Sagittal and coronal reformats were performed.    FINDINGS:  LOWER CHEST: Within normal limits.    LIVER: Probable focal fat adjacent to the falciform ligament.  BILE DUCTS: Normal caliber.  GALLBLADDER: Within normal limits.  SPLEEN: Within normal limits.  PANCREAS: Within normal limits.  ADRENALS: Within normal limits.  KIDNEYS/URETERS: Moderate left hydroureteronephrosis with 3 mm stone at   the left ureterovesicular junction. Delayed enhancement of the left   kidney. No right hydronephrosis.    BLADDER: Within normal limits.  REPRODUCTIVE ORGANS: Uterus and adnexa within normal limits.    BOWEL: Small hiatal hernia. Sleeve gastrectomy. No bowel obstruction.   Appendix is normal.  PERITONEUM: No ascites.  VESSELS: Within normal limits.  RETROPERITONEUM/LYMPH NODES: No lymphadenopathy.  ABDOMINAL WALL: Within normal limits.  BONES: Within normal limits.    IMPRESSION:  Moderate left hydroureteronephrosis due to a 3 mm obstructing stone at   the left ureterovesicular junction.        --- End of Report ---     HPI:  Patient is a 32y Female with gastric sleeve surgery surgery with [place] 5 weeks, HTN, and HLD who presents with left low back, left flank and bilateral lower abdominal pain.     She has been eating very little due to feeling nauseous and dry heaving whenever she eats since her procedure. She states she has had difficulty urinating. She is unsure of gross hematuria due to recent menses. Denies fever, vomiting, or dysuria.     Patient denies ever having a kidney stone in the past. There is a family history of kidney stones in her sister.     In the ER, she has been afebrile, and other vitals are WNL. WBC 7.7. Latest Cr is 1.10 in the ER, which has gradually increased since arrival from 0.74. U/A is negative for nitrites or leuk est; she has proteins, ketones and glucose in the urine. CT abd/pelv shows moderate left hydroureteronephrosis with an obstructing 3 mm stone at the left UVJ. She has been receiving Dilaudid in the ER, which was initially helping, but patient states she feels has pain.     PAST MEDICAL & SURGICAL HISTORY:  No pertinent past medical history      H/O gastric sleeve        FAMILY HISTORY:   No known  malignancy   SOCIAL HISTORY: Retired   Tobacco hx: smoker/nonsmoker   MEDICATIONS  (STANDING):  lactated ringers. 1000 milliLiter(s) (150 mL/Hr) IV Continuous <Continuous>    MEDICATIONS  (PRN):    Allergies    No Known Allergies    Intolerances        REVIEW OF SYSTEMS: Pertinent positives and negatives as stated in HPI, otherwise negative    Vital signs  T(C): 36.7 (10-06-23 @ 04:22), Max: 36.9 (10-05-23 @ 17:15)  HR: 72 (10-06-23 @ 04:22)  BP: 104/74 (10-06-23 @ 04:22)  SpO2: 97% (10-06-23 @ 04:22)  Wt(kg): --    Physical Exam  Gen: NAD  HEENT: normocephalic, atraumatic, no scleral icterus  Pulm: No respiratory distress, no subcostal retractions, no accessory muscle use   CV: No JVD  Abd: Soft, ND, no guarding. No CVA tenderness bilaterally.  : Bladder not palpable.  MSK:  Moving all extremities.  NEURO: A&Ox3  SKIN: warm, dry, no rash.    LABS:        10-06 @ 03:10    WBC --    / Hct --    / SCr 1.10     10-05 @ 21:04    WBC --    / Hct --    / SCr 1.00     10-06    138  |  97  |  11  ----------------------------<  123<H>  3.3<L>   |  22  |  1.10    Ca    9.0      06 Oct 2023 03:10    TPro  6.9  /  Alb  4.4  /  TBili  0.6  /  DBili  x   /  AST  28  /  ALT  14  /  AlkPhos  56  10-05      Urinalysis Basic - ( 06 Oct 2023 03:27 )    Color: Yellow / Appearance: Clear / SG: >1.050 / pH: x  Gluc: x / Ketone: Large  / Bili: Small / Urobili: 2 mg/dL   Blood: x / Protein: 30 mg/dL / Nitrite: Negative   Leuk Esterase: Negative / RBC: 9 /hpf / WBC 3 /HPF   Sq Epi: x / Non Sq Epi: x / Bacteria: Few      Radiology:   PROCEDURE DATE:  10/05/2023          INTERPRETATION:  CLINICAL INFORMATION: Periumbilical pain. Status post   sleeve gastrectomy.    COMPARISON: None.    CONTRAST/COMPLICATIONS:  IV Contrast: Omnipaque 350  90 cc administered   10 cc discarded  Oral Contrast: NONE  Complications: None reported at time of study completion    PROCEDURE:  CT of the Abdomen and Pelvis was performed.  Sagittal and coronal reformats were performed.    FINDINGS:  LOWER CHEST: Within normal limits.    LIVER: Probable focal fat adjacent to the falciform ligament.  BILE DUCTS: Normal caliber.  GALLBLADDER: Within normal limits.  SPLEEN: Within normal limits.  PANCREAS: Within normal limits.  ADRENALS: Within normal limits.  KIDNEYS/URETERS: Moderate left hydroureteronephrosis with 3 mm stone at   the left ureterovesicular junction. Delayed enhancement of the left   kidney. No right hydronephrosis.    BLADDER: Within normal limits.  REPRODUCTIVE ORGANS: Uterus and adnexa within normal limits.    BOWEL: Small hiatal hernia. Sleeve gastrectomy. No bowel obstruction.   Appendix is normal.  PERITONEUM: No ascites.  VESSELS: Within normal limits.  RETROPERITONEUM/LYMPH NODES: No lymphadenopathy.  ABDOMINAL WALL: Within normal limits.  BONES: Within normal limits.    IMPRESSION:  Moderate left hydroureteronephrosis due to a 3 mm obstructing stone at   the left ureterovesicular junction.        --- End of Report ---     HPI:  Patient is a 32y Female with gastric sleeve surgery surgery with [place] 5 weeks, HTN, and HLD who presents with left low back, left flank and bilateral lower abdominal pain.     She has been eating very little due to feeling nauseous and dry heaving whenever she eats since her procedure. She states she has had difficulty urinating. She is unsure of gross hematuria due to recent menses. Denies fever, vomiting, or dysuria.     Patient denies ever having a kidney stone in the past. There is a family history of kidney stones in her sister.     In the ER, she has been afebrile, and other vitals are WNL. WBC 7.7. Latest Cr is 1.10 in the ER, which has gradually increased since arrival from 0.74. U/A is negative for nitrites or leuk est; she has proteins, ketones and glucose in the urine. CT abd/pelv shows moderate left hydroureteronephrosis with an obstructing 3 mm stone at the left UVJ. She has been receiving Dilaudid in the ER, which was initially helping, but patient states she feels has pain.     PAST MEDICAL & SURGICAL HISTORY:  No pertinent past medical history      H/O gastric sleeve        FAMILY HISTORY:   No known  malignancy   SOCIAL HISTORY: Retired   Tobacco hx: smoker/nonsmoker   MEDICATIONS  (STANDING):  lactated ringers. 1000 milliLiter(s) (150 mL/Hr) IV Continuous <Continuous>    MEDICATIONS  (PRN):    Allergies    No Known Allergies    Intolerances        REVIEW OF SYSTEMS: Pertinent positives and negatives as stated in HPI, otherwise negative    Vital signs  T(C): 36.7 (10-06-23 @ 04:22), Max: 36.9 (10-05-23 @ 17:15)  HR: 72 (10-06-23 @ 04:22)  BP: 104/74 (10-06-23 @ 04:22)  SpO2: 97% (10-06-23 @ 04:22)  Wt(kg): --    Physical Exam  Gen: NAD  HEENT: normocephalic, atraumatic, no scleral icterus  Pulm: No respiratory distress, no subcostal retractions, no accessory muscle use   CV: No JVD  Abd: Soft, ND, no guarding. No CVA tenderness bilaterally.  : Bladder not palpable.  MSK:  Moving all extremities.  NEURO: A&Ox3  SKIN: warm, dry, no rash.    LABS:        10-06 @ 03:10    WBC --    / Hct --    / SCr 1.10     10-05 @ 21:04    WBC --    / Hct --    / SCr 1.00     10-06    138  |  97  |  11  ----------------------------<  123<H>  3.3<L>   |  22  |  1.10    Ca    9.0      06 Oct 2023 03:10    TPro  6.9  /  Alb  4.4  /  TBili  0.6  /  DBili  x   /  AST  28  /  ALT  14  /  AlkPhos  56  10-05      Urinalysis Basic - ( 06 Oct 2023 03:27 )    Color: Yellow / Appearance: Clear / SG: >1.050 / pH: x  Gluc: x / Ketone: Large  / Bili: Small / Urobili: 2 mg/dL   Blood: x / Protein: 30 mg/dL / Nitrite: Negative   Leuk Esterase: Negative / RBC: 9 /hpf / WBC 3 /HPF   Sq Epi: x / Non Sq Epi: x / Bacteria: Few      Radiology:   PROCEDURE DATE:  10/05/2023          INTERPRETATION:  CLINICAL INFORMATION: Periumbilical pain. Status post   sleeve gastrectomy.    COMPARISON: None.    CONTRAST/COMPLICATIONS:  IV Contrast: Omnipaque 350  90 cc administered   10 cc discarded  Oral Contrast: NONE  Complications: None reported at time of study completion    PROCEDURE:  CT of the Abdomen and Pelvis was performed.  Sagittal and coronal reformats were performed.    FINDINGS:  LOWER CHEST: Within normal limits.    LIVER: Probable focal fat adjacent to the falciform ligament.  BILE DUCTS: Normal caliber.  GALLBLADDER: Within normal limits.  SPLEEN: Within normal limits.  PANCREAS: Within normal limits.  ADRENALS: Within normal limits.  KIDNEYS/URETERS: Moderate left hydroureteronephrosis with 3 mm stone at   the left ureterovesicular junction. Delayed enhancement of the left   kidney. No right hydronephrosis.    BLADDER: Within normal limits.  REPRODUCTIVE ORGANS: Uterus and adnexa within normal limits.    BOWEL: Small hiatal hernia. Sleeve gastrectomy. No bowel obstruction.   Appendix is normal.  PERITONEUM: No ascites.  VESSELS: Within normal limits.  RETROPERITONEUM/LYMPH NODES: No lymphadenopathy.  ABDOMINAL WALL: Within normal limits.  BONES: Within normal limits.    IMPRESSION:  Moderate left hydroureteronephrosis due to a 3 mm obstructing stone at   the left ureterovesicular junction.        --- End of Report ---

## 2023-10-06 NOTE — H&P ADULT - ASSESSMENT
32F w/Hx of s/p gastric sleeve performed 5 weeks ago at [place] presenting with difficulty eating, nausea and L flank pain radiating to her groin. 32F w/Hx of s/p gastric sleeve performed 5 weeks ago at [place] presenting with difficulty eating, nausea and L flank pain radiating to her groin. 32F w/Hx of s/p gastric sleeve performed 5 weeks ago at [place] presenting with difficulty eating, nausea and L flank pain radiating to her groin.

## 2023-10-06 NOTE — CONSULT NOTE ADULT - ASSESSMENT
32y Female with gastric sleeve surgery surgery with [place] 5 weeks, HTN, and HLD who presents with left low back, left flank and bilateral lower abdominal pain found to have moderate left hydroureteronephrosis with an obstructing 3 mm stone at the left UVJ.     Ureteral stone  - With size and location of stone, recommend tamsulosin and aggressive fluid hydration to help pass the stone  - Strain urine  - Pain medication as needed  - Monitor Cr  - Will continue to follow   32y Female with gastric sleeve surgery surgery with [place] 5 weeks, HTN, and HLD who presents with left low back, left flank and bilateral lower abdominal pain found to have moderate left hydroureteronephrosis with an obstructing 3 mm stone at the left UVJ.     Ureteral stone  - With size and location of stone, recommend tamsulosin and aggressive fluid hydration to help pass the stone  - Strain urine  - Pain medication as needed  - Monitor Cr  - Will continue to follow   32y Female with gastric sleeve surgery surgery with [place] 5 weeks, HTN, and HLD who presents with left low back, left flank and bilateral lower abdominal pain found to have moderate left hydroureteronephrosis with an obstructing 3 mm stone at the left UVJ.     Ureteral stone  - With size and location of stone, recommend tamsulosin and aggressive fluid hydration to help pass the stone  - Strain urine  - Pain medication as needed  - Monitor Cr  - Will continue to follow

## 2023-10-06 NOTE — H&P ADULT - PROBLEM SELECTOR PLAN 3
Pt with elevated glucose and glucose in urine  - F/u A1c, if elevated begin POCT monitoring and insulin

## 2023-10-06 NOTE — CHART NOTE - NSCHARTNOTEFT_GEN_A_CORE
Pt seen and examined. No acute events overnight. Admitted for L renal stone.  c/o left groin pan 8/10.  On exam AAOX3, VSS, s1s2, no mrg, abd is soft, nt, nd, bs+, No CVA tenderness bilaterally.  Labs reviewed ; UA w/few bacteria, negative for nitrite/LE/wbc  Urology reccs appreciated  c/w tamsulosin and aggressive fluid hydration to help pass the stone  Strain urine  Pain control   Monitor Cr    d/w JADEN Walker

## 2023-10-06 NOTE — H&P ADULT - NSHPPHYSICALEXAM_GEN_ALL_CORE
T(C): 36.7 (10-06-23 @ 04:22), Max: 36.9 (10-05-23 @ 17:15)  HR: 72 (10-06-23 @ 04:22) (64 - 80)  BP: 104/74 (10-06-23 @ 04:22) (104/74 - 126/89)  RR: 16 (10-06-23 @ 04:22) (16 - 20)  SpO2: 97% (10-06-23 @ 04:22) (97% - 100%)    CONSTITUTIONAL: No apparent distress  EYES: PERRLA and symmetric, EOMI, No conjunctival or scleral injection, non-icteric  ENMT: Oral mucosa with moist membranes.  NECK: Supple, symmetric. No JVD.  RESP: No respiratory distress, no use of accessory muscles; CTA b/l, no WRR  CV: RRR, +S1S2, no MRG  GI: Soft, NT, ND, no rebound, no guarding  : No suprapubic tenderness. No CVA tenderness.  LYMPH: No cervical LAD or tenderness  MSK: No spinal tenderness, normal muscle strength/tone  EXTREMITIES: No pedal edema  SKIN: No rashes or ulcers noted  NEURO: A+Ox3, responsive. No tremor, sensation intact in upper and lower extremities b/l  PSYCH: Appropriate insight/judgment; mood and affect appropriate, recent/remote memory intact

## 2023-10-06 NOTE — H&P ADULT - PROBLEM SELECTOR PLAN 2
- Pt with recent sleeve placed in [facility]  - Reports 40 pound weight loss and resolution of previous HTN and HLD  - Cw home pantoprazole - Pt with recent sleeve placed in [facility]  - Reports 40 pound weight loss and resolution of previous HTN and HLD  - Cw home pantoprazole - Pt with recent sleeve placed in [facility]  - Reports 40 pound weight loss and resolution of previous HTN and HLD  - Cw home pantoprazole

## 2023-10-06 NOTE — H&P ADULT - PROBLEM SELECTOR PLAN 1
- CT Abdomen: Moderate left hydroureteronephrosis due to a 3 mm obstructing stone at the left ureterovesicular junction.  - LR@150  - Zofran PRN nausea  - Tylenol, Morphine PRN pain  - Flomax 0.8mg x1  - Strain urine  - Urology consulted no procedural intervention at this time, will follow recs  - Clear liq diet, advance as tolerated

## 2023-10-06 NOTE — H&P ADULT - HISTORY OF PRESENT ILLNESS
32F w/Hx of s/p gastric sleeve performed 5 weeks ago at [place] presenting with difficulty eating, nausea and L flank pain radiating to her groin. Patient has never had pain like this before and it has worsened over the last 2-3 days. Currently, patient has been unable to tolerate PO. On exam, pt reports improvement of pain and nausea and willingness to attempt clear liquid diet. Reports she has lost 40 pounds since sleeve placement and has developed GERD and uses pantoprazole PRN. She used to be on medication for HTN and HLD but her bloodwork has improved since weight loss and she is no longer on any other medication. Reports hematuria bt also just completed her menstrual period yesterday. Reports constipation and has not had a BM in over 3 days. Patient denies fever, chills, chest pain, SOB, headache, dizziness, constipation, dysuria. 32F w/Hx of s/p gastric sleeve performed 5 weeks ago at [place] presenting with difficulty eating, nausea and L flank pain radiating to her groin. Patient has never had pain like this before and it has worsened over the last 2-3 days. Currently, patient has been unable to tolerate PO. On exam, pt reports improvement of pain and nausea and willingness to attempt clear liquid diet. Reports she has lost 40 pounds since sleeve placement and has developed GERD and uses pantoprazole PRN. She used to be on medication for HTN and HLD but her bloodwork has improved since weight loss and she is no longer on any other medication. Reports hematuria bt also just completed her menstrual period yesterday. Reports constipation and has not had a BM in over 3 days. Patient denies fever, chills, chest pain, SOB, headache, dizziness, constipation, dysuria. 32F w/Hx of s/p gastric sleeve performed 5 weeks ago at [place] presenting with difficulty eating, nausea and L flank pain radiating to her groin. Patient has never had pain like this before and it has worsened over the last 2-3 days. Currently, patient has been unable to tolerate PO. On exam, pt reports improvement of pain and nausea and willingness to attempt clear liquid diet. Reports she has lost 40 pounds since sleeve placement and has developed GERD and uses pantoprazole PRN. She used to be on medication for HTN and HLD but her bloodwork has improved since weight loss and she is no longer on any other medication. Reports hematuria bt also just completed her menstrual period yesterday. Reports constipation and has not had a BM in over 3 days. Patient denies fever, chills, chest pain, SOB, headache, dizziness, constipation, dysuria.

## 2023-10-06 NOTE — ED ADULT NURSE REASSESSMENT NOTE - NS ED NURSE REASSESS COMMENT FT1
Pt states not wanting pain medication right now, pain has improved, pt states she wants to sleep. No further rn interventions are needed. Hospitalist aware OK'd rescheduling medication.

## 2023-10-06 NOTE — ED ADULT NURSE REASSESSMENT NOTE - NS ED NURSE REASSESS COMMENT FT1
REport received from BRONSON Potter in pink. Pt AxOx3, observed sitting up in stretcher conversing with RN without difficulty. Breathing spontaneous and unlabored. Pt updated on plan of care awaiting bed assignment. Reporting pain, medicated as per MD order.  No acute distress noted. Call bell within reach. REport received from BRONSON Potter in pink. Pt AxOx3, observed sitting up in stretcher conversing with RN without difficulty. Breathing spontaneous and unlabored. Pt updated on plan of care awaiting bed assignment. Reporting pain, medicated as per MD order. RTM med. No acute distress noted. Call bell within reach.

## 2023-10-06 NOTE — ED ADULT NURSE REASSESSMENT NOTE - NS ED NURSE REASSESS COMMENT FT1
pt is A&Ox4 respirations are even and nonlabored, pt is currently still receiving first bolus of IV fluids, pt was educated on keeping are straight while fluids are going. No further RN interventions are needed at this time.

## 2023-10-07 LAB
ANION GAP SERPL CALC-SCNC: 12 MMOL/L — SIGNIFICANT CHANGE UP (ref 5–17)
BUN SERPL-MCNC: 9 MG/DL — SIGNIFICANT CHANGE UP (ref 7–23)
CALCIUM SERPL-MCNC: 8.9 MG/DL — SIGNIFICANT CHANGE UP (ref 8.4–10.5)
CHLORIDE SERPL-SCNC: 102 MMOL/L — SIGNIFICANT CHANGE UP (ref 96–108)
CO2 SERPL-SCNC: 25 MMOL/L — SIGNIFICANT CHANGE UP (ref 22–31)
CREAT SERPL-MCNC: 0.9 MG/DL — SIGNIFICANT CHANGE UP (ref 0.5–1.3)
CULTURE RESULTS: SIGNIFICANT CHANGE UP
EGFR: 88 ML/MIN/1.73M2 — SIGNIFICANT CHANGE UP
GLUCOSE SERPL-MCNC: 80 MG/DL — SIGNIFICANT CHANGE UP (ref 70–99)
POTASSIUM SERPL-MCNC: 3.7 MMOL/L — SIGNIFICANT CHANGE UP (ref 3.5–5.3)
POTASSIUM SERPL-SCNC: 3.7 MMOL/L — SIGNIFICANT CHANGE UP (ref 3.5–5.3)
SODIUM SERPL-SCNC: 139 MMOL/L — SIGNIFICANT CHANGE UP (ref 135–145)
SPECIMEN SOURCE: SIGNIFICANT CHANGE UP

## 2023-10-07 PROCEDURE — 99232 SBSQ HOSP IP/OBS MODERATE 35: CPT

## 2023-10-07 PROCEDURE — 99232 SBSQ HOSP IP/OBS MODERATE 35: CPT | Mod: GC

## 2023-10-07 RX ORDER — INFLUENZA VIRUS VACCINE 15; 15; 15; 15 UG/.5ML; UG/.5ML; UG/.5ML; UG/.5ML
0.5 SUSPENSION INTRAMUSCULAR ONCE
Refills: 0 | Status: DISCONTINUED | OUTPATIENT
Start: 2023-10-07 | End: 2023-10-08

## 2023-10-07 RX ADMIN — SENNA PLUS 2 TABLET(S): 8.6 TABLET ORAL at 20:20

## 2023-10-07 RX ADMIN — SODIUM CHLORIDE 150 MILLILITER(S): 9 INJECTION, SOLUTION INTRAVENOUS at 17:14

## 2023-10-07 RX ADMIN — CHLORHEXIDINE GLUCONATE 1 APPLICATION(S): 213 SOLUTION TOPICAL at 11:59

## 2023-10-07 NOTE — PROGRESS NOTE ADULT - ASSESSMENT
32F w/Hx of s/p gastric sleeve performed 5 weeks ago at [place] presenting with difficulty eating, nausea and L flank pain radiating to her groin. 32F w/Hx of s/p gastric sleeve performed 5 weeks ago at [place] presenting with difficulty eating, nausea and L flank pain radiating to her groin. 32F w/Hx of s/p gastric sleeve performed 5 weeks ago at [place] presenting with difficulty eating, nausea and L flank pain radiating to her groin.

## 2023-10-07 NOTE — PROGRESS NOTE ADULT - SUBJECTIVE AND OBJECTIVE BOX
Subjective  No events overnight    Objective    Vital signs  T(F): , Max: 98.9 (10-06-23 @ 15:15)  HR: 59 (10-06-23 @ 18:30)  BP: 102/67 (10-06-23 @ 18:30)  SpO2: 99% (10-06-23 @ 18:30)  Wt(kg): --    Output         Gen: NAD  Abd: soft, nontender, nondistended  : voiding    Labs      10-07 @ 00:49    WBC --    / Hct --    / SCr 0.90     10-06 @ 14:24    WBC 8.94  / Hct 33.4  / SCr 1.09           Urine Cx: pending   Blood Cx: ?    Imaging Subjective  No events overnight. Pain controlled this AM. Voiding well.     Objective    Vital signs  T(F): , Max: 98.9 (10-06-23 @ 15:15)  HR: 59 (10-06-23 @ 18:30)  BP: 102/67 (10-06-23 @ 18:30)  SpO2: 99% (10-06-23 @ 18:30)  Wt(kg): --    Output         Gen: NAD  : voiding    Labs      10-07 @ 00:49    WBC --    / Hct --    / SCr 0.90     10-06 @ 14:24    WBC 8.94  / Hct 33.4  / SCr 1.09           Urine Cx: pending   Blood Cx: ?    Imaging

## 2023-10-07 NOTE — PROGRESS NOTE ADULT - ASSESSMENT
32y Female with gastric sleeve surgery surgery with [place] 5 weeks, HTN, and HLD who presents with left low back, left flank and bilateral lower abdominal pain found to have moderate left hydroureteronephrosis with an obstructing 3 mm stone at the left UVJ.     Ureteral stone  - With size and location of stone, recommend tamsulosin and aggressive fluid hydration to help pass the stone  - Pain medication as needed  - Monitor Cr-> down trending 0.9 (1.10)  -Trial of medical expulsive therapy with PO pain control  -Flomax 0.4mg qHS x30 days  -Zofran PRN nausea  -Senna, colace, miralax  -Aggressive hydration  -Strain urine for calculi  -Recall for any fever > 100.4, pain uncontrolled on discharge pain medications, or inability to tolerate PO  -Follow up with **   in clinic [phone].    ***INCOMPLETE***** 32y Female with gastric sleeve surgery surgery with [place] 5 weeks, HTN, and HLD who presents with left low back, left flank and bilateral lower abdominal pain found to have moderate left hydroureteronephrosis with an obstructing 3 mm stone at the left UVJ.     Ureteral stone  - With size and location of stone, recommend tamsulosin and aggressive fluid hydration to help pass the stone  - Pain medication as needed  - Monitor Cr-> down trending 0.9 (1.10)  -Trial of medical expulsive therapy with PO pain control  -Flomax 0.4mg qHS x30 days  -Zofran PRN nausea  -Senna, colace, miralax  -Aggressive hydration  -Strain urine for calculi  -Recall for any fever > 100.4, pain uncontrolled on discharge pain medications, or inability to tolerate PO  -Follow up with **   in clinic [phone].    ***INCOMPLETE***** 32y Female with gastric sleeve surgery surgery with [place] 5 weeks, HTN, and HLD who presents with left low back, left flank and bilateral lower abdominal pain found to have moderate left hydroureteronephrosis with an obstructing 3 mm stone at the left UVJ.     Ureteral stone  - With size and location of stone, recommend tamsulosin and aggressive fluid hydration to help pass the stone  - Pain medication as needed  - Monitor Cr-> down trending 0.9 (1.10)  -Trial of medical expulsive therapy with PO pain control  -Flomax 0.4mg qHS x30 days  -Zofran PRN nausea  -Senna, colace, miralax  -Aggressive hydration  -Strain urine for calculi  -Recall for any fever > 100.4, pain uncontrolled on discharge pain medications, or inability to tolerate PO  -Follow up with **   in clinic [phone].    ***INCOMPLETE***** 32y Female with gastric sleeve surgery surgery with [place] 5 weeks, HTN, and HLD who presents with left low back, left flank and bilateral lower abdominal pain found to have moderate left hydroureteronephrosis with an obstructing 3 mm stone at the left UVJ.     Ureteral stone  - Pain medication as needed  - Cr now 0.9  -Continue medical expulsive therapy with PO pain control  -Flomax 0.4mg qHS x30 days  -Zofran PRN nausea  -Senna, colace, miralax  -Aggressive hydration  -Strain urine for calculi  -Recall for any fever > 100.4, pain uncontrolled on discharge pain medications, or inability to tolerate PO  -Follow up with Dr. Ackerman in clinic [phone].     32y Female with gastric sleeve surgery surgery with [place] 5 weeks, HTN, and HLD who presents with left low back, left flank and bilateral lower abdominal pain found to have moderate left hydroureteronephrosis with an obstructing 3 mm stone at the left UVJ.     Ureteral stone  - Pain medication as needed  - Cr now 0.9  -Continue medical expulsive therapy with PO pain control  -Flomax 0.4mg qHS x30 days  -Zofran PRN nausea  -Senna, colace, miralax  -Aggressive hydration  -Strain urine for calculi  -Recall for any fever > 100.4, pain uncontrolled on discharge pain medications, or inability to tolerate PO  -Follow up with Dr. Ackerman in clinic [phone].     32y Female with gastric sleeve surgery surgery with [place] 5 weeks, HTN, and HLD who presents with left low back, left flank and bilateral lower abdominal pain found to have moderate left hydroureteronephrosis with an obstructing 3 mm stone at the left UVJ.     Ureteral stone  - Pain medication as needed  - Cr now 0.9  -Continue medical expulsive therapy with PO pain control  -Flomax 0.4mg qHS x30 days  -Zofran PRN nausea  -Senna, colace, miralax  -Aggressive hydration  -Strain urine for calculi  -Recall for any fever > 100.4, pain uncontrolled on discharge pain medications, or inability to tolerate PO  -Follow up with Dr. Ackerman in clinic [phone].

## 2023-10-07 NOTE — PATIENT PROFILE ADULT - NSPROGENOTHERPROVIDER_GEN_A_NUR
Detail Level: Zone Details (Free Text): To observe site Photo Preface (Leave Blank If You Do Not Want): Photographs were obtained today none

## 2023-10-07 NOTE — PROGRESS NOTE ADULT - NSPROGADDITIONALINFOA_GEN_ALL_CORE
time spent reviewing prior charts, meds, discussing plan with patient=  45 minutes    Case d/w ACP Malena

## 2023-10-07 NOTE — PATIENT PROFILE ADULT - FALL HARM RISK - UNIVERSAL INTERVENTIONS
Bed in lowest position, wheels locked, appropriate side rails in place/Call bell, personal items and telephone in reach/Instruct patient to call for assistance before getting out of bed or chair/Non-slip footwear when patient is out of bed/[place] to call system/Physically safe environment - no spills, clutter or unnecessary equipment/Purposeful Proactive Rounding/Room/bathroom lighting operational, light cord in reach Bed in lowest position, wheels locked, appropriate side rails in place/Call bell, personal items and telephone in reach/Instruct patient to call for assistance before getting out of bed or chair/Non-slip footwear when patient is out of bed/[place] to call system/Physically safe environment - no spills, clutter or unnecessary equipment/Purposeful Proactive Rounding/Room/bathroom lighting operational, light cord in reach Bed in lowest position, wheels locked, appropriate side rails in place/Call bell, personal items and telephone in reach/Instruct patient to call for assistance before getting out of bed or chair/Non-slip footwear when patient is out of bed/[place] to call system/Physically safe environment - no spills, clutter or unnecessary equipment/Purposeful Proactive Rounding/Room/bathroom lighting operational, light cord in reach

## 2023-10-07 NOTE — PROGRESS NOTE ADULT - SUBJECTIVE AND OBJECTIVE BOX
Patient is a 32y old  Female who presents with a chief complaint of Renal Calculus (07 Oct 2023 14:34)      SUBJECTIVE / OVERNIGHT EVENTS: Pt seen and examined. No acute events overnight. She reports marked improvement in abd pain. Denies fever/chills, n/v.    MEDICATIONS  (STANDING):  chlorhexidine 2% Cloths 1 Application(s) Topical daily  influenza   Vaccine 0.5 milliLiter(s) IntraMuscular once  lactated ringers. 1000 milliLiter(s) (150 mL/Hr) IV Continuous <Continuous>  naloxone Injectable 0.4 milliGRAM(s) IV Push once  polyethylene glycol 3350 17 Gram(s) Oral daily  senna 2 Tablet(s) Oral at bedtime    MEDICATIONS  (PRN):  bisacodyl 5 milliGRAM(s) Oral daily PRN Constipation  melatonin 3 milliGRAM(s) Oral at bedtime PRN Insomnia  morphine  - Injectable 2 milliGRAM(s) IV Push every 4 hours PRN Moderate Pain (4 - 6)  morphine  - Injectable 4 milliGRAM(s) IV Push every 4 hours PRN Severe Pain (7 - 10)  ondansetron Injectable 4 milliGRAM(s) IV Push every 6 hours PRN Nausea and/or Vomiting      Vital Signs Last 24 Hrs  T(C): 36.4 (07 Oct 2023 07:55), Max: 36.6 (06 Oct 2023 18:30)  T(F): 97.5 (07 Oct 2023 07:55), Max: 97.9 (06 Oct 2023 18:30)  HR: 51 (07 Oct 2023 07:55) (51 - 59)  BP: 106/68 (07 Oct 2023 07:55) (102/67 - 106/68)  BP(mean): --  RR: 18 (07 Oct 2023 07:55) (18 - 18)  SpO2: 99% (07 Oct 2023 07:55) (99% - 99%)    Parameters below as of 07 Oct 2023 07:55  Patient On (Oxygen Delivery Method): room air      CAPILLARY BLOOD GLUCOSE        I&O's Summary    06 Oct 2023 07:01  -  07 Oct 2023 07:00  --------------------------------------------------------  IN: 0 mL / OUT: 0 mL / NET: 0 mL    07 Oct 2023 07:01  -  07 Oct 2023 15:38  --------------------------------------------------------  IN: 0 mL / OUT: 250 mL / NET: -250 mL        PHYSICAL EXAM:  GENERAL: NAD, well-groomed  HEAD:  Atraumatic, Normocephalic  EYES: EOMI, PERRLA, conjunctiva and sclera clear  NECK: Supple, No JVD  CHEST/LUNG: Clear to auscultation bilaterally; No wheeze  HEART: Regular rate and rhythm; No murmurs, rubs, or gallops  ABDOMEN: Soft, Nontender, Nondistended; Bowel sounds present  EXTREMITIES:  2+ Peripheral Pulses, No clubbing, cyanosis, or edema  PSYCH: AAOx3  NEUROLOGY: non-focal  SKIN: No rashes or lesions    LABS:                        11.5   8.94  )-----------( 193      ( 06 Oct 2023 14:24 )             33.4     10-07    139  |  102  |  9   ----------------------------<  80  3.7   |  25  |  0.90    Ca    8.9      07 Oct 2023 00:49  Mg     1.7     10-06    TPro  6.9  /  Alb  4.4  /  TBili  0.6  /  DBili  x   /  AST  28  /  ALT  14  /  AlkPhos  56  10-05          Urinalysis Basic - ( 07 Oct 2023 00:49 )    Color: x / Appearance: x / SG: x / pH: x  Gluc: 80 mg/dL / Ketone: x  / Bili: x / Urobili: x   Blood: x / Protein: x / Nitrite: x   Leuk Esterase: x / RBC: x / WBC x   Sq Epi: x / Non Sq Epi: x / Bacteria: x        Consultant(s) Notes Reviewed:  Urology

## 2023-10-07 NOTE — PROGRESS NOTE ADULT - PROBLEM SELECTOR PLAN 2
- Pt with recent sleeve placed in [facility]  - Reports 40 pound weight loss and resolution of previous HTN and HLD  - Cw home pantoprazole - Pt with recent sleeve placed in [facility]  - Reports 40 pound weight loss and resolution of previous HTN and HLD  - Cw home pantoprazole - Pt with recent sleeve placed in [facility]  - Reports 40 pound weight loss and resolution of previous HTN and HLD  - Cw home pantoprazole

## 2023-10-08 ENCOUNTER — TRANSCRIPTION ENCOUNTER (OUTPATIENT)
Age: 33
End: 2023-10-08

## 2023-10-08 VITALS
TEMPERATURE: 98 F | SYSTOLIC BLOOD PRESSURE: 123 MMHG | HEART RATE: 41 BPM | OXYGEN SATURATION: 95 % | RESPIRATION RATE: 18 BRPM | DIASTOLIC BLOOD PRESSURE: 86 MMHG

## 2023-10-08 PROCEDURE — 83690 ASSAY OF LIPASE: CPT

## 2023-10-08 PROCEDURE — 82947 ASSAY GLUCOSE BLOOD QUANT: CPT

## 2023-10-08 PROCEDURE — 84295 ASSAY OF SERUM SODIUM: CPT

## 2023-10-08 PROCEDURE — 80048 BASIC METABOLIC PNL TOTAL CA: CPT

## 2023-10-08 PROCEDURE — 87086 URINE CULTURE/COLONY COUNT: CPT

## 2023-10-08 PROCEDURE — 85027 COMPLETE CBC AUTOMATED: CPT

## 2023-10-08 PROCEDURE — 85025 COMPLETE CBC W/AUTO DIFF WBC: CPT

## 2023-10-08 PROCEDURE — 82435 ASSAY OF BLOOD CHLORIDE: CPT

## 2023-10-08 PROCEDURE — 99239 HOSP IP/OBS DSCHRG MGMT >30: CPT

## 2023-10-08 PROCEDURE — 80053 COMPREHEN METABOLIC PANEL: CPT

## 2023-10-08 PROCEDURE — 82803 BLOOD GASES ANY COMBINATION: CPT

## 2023-10-08 PROCEDURE — 99285 EMERGENCY DEPT VISIT HI MDM: CPT

## 2023-10-08 PROCEDURE — 84132 ASSAY OF SERUM POTASSIUM: CPT

## 2023-10-08 PROCEDURE — 81001 URINALYSIS AUTO W/SCOPE: CPT

## 2023-10-08 PROCEDURE — 82330 ASSAY OF CALCIUM: CPT

## 2023-10-08 PROCEDURE — 83605 ASSAY OF LACTIC ACID: CPT

## 2023-10-08 PROCEDURE — 85018 HEMOGLOBIN: CPT

## 2023-10-08 PROCEDURE — 84702 CHORIONIC GONADOTROPIN TEST: CPT

## 2023-10-08 PROCEDURE — 85014 HEMATOCRIT: CPT

## 2023-10-08 PROCEDURE — 74177 CT ABD & PELVIS W/CONTRAST: CPT | Mod: MA

## 2023-10-08 PROCEDURE — 36415 COLL VENOUS BLD VENIPUNCTURE: CPT

## 2023-10-08 PROCEDURE — 82962 GLUCOSE BLOOD TEST: CPT

## 2023-10-08 PROCEDURE — 83735 ASSAY OF MAGNESIUM: CPT

## 2023-10-08 PROCEDURE — 83036 HEMOGLOBIN GLYCOSYLATED A1C: CPT

## 2023-10-08 RX ORDER — TAMSULOSIN HYDROCHLORIDE 0.4 MG/1
0.4 CAPSULE ORAL AT BEDTIME
Refills: 0 | Status: DISCONTINUED | OUTPATIENT
Start: 2023-10-08 | End: 2023-10-08

## 2023-10-08 RX ORDER — SENNA PLUS 8.6 MG/1
2 TABLET ORAL
Qty: 0 | Refills: 0 | DISCHARGE
Start: 2023-10-08

## 2023-10-08 RX ORDER — TAMSULOSIN HYDROCHLORIDE 0.4 MG/1
1 CAPSULE ORAL
Qty: 30 | Refills: 0
Start: 2023-10-08 | End: 2023-11-06

## 2023-10-08 RX ADMIN — CHLORHEXIDINE GLUCONATE 1 APPLICATION(S): 213 SOLUTION TOPICAL at 11:28

## 2023-10-08 RX ADMIN — SODIUM CHLORIDE 150 MILLILITER(S): 9 INJECTION, SOLUTION INTRAVENOUS at 11:28

## 2023-10-08 NOTE — PROGRESS NOTE ADULT - PROBLEM SELECTOR PLAN 3
Pt with elevated glucose and glucose in urine  - A1C 5.2
Pt with elevated glucose and glucose in urine  - A1C 5.2

## 2023-10-08 NOTE — PROGRESS NOTE ADULT - PROBLEM SELECTOR PLAN 1
- CT Abdomen: Moderate left hydroureteronephrosis due to a 3 mm obstructing stone at the left ureterovesicular junction.  --Continue medical expulsive therapy with PO pain control  -Flomax 0.4mg qHS x30 days  -Zofran PRN nausea  -Senna, colace, miralax  -Aggressive hydration  -Strain urine for calculi  - Urine cx negative  - Urology reccs appreciated
- CT Abdomen: Moderate left hydroureteronephrosis due to a 3 mm obstructing stone at the left ureterovesicular junction.  -on medical expulsive therapy , aggressive IV hydration, PO pain control  - c/w Flomax 0.4mg qHS x30 days  - c/w Zofran PRN nausea  - c/w Senna, colace, miralax  - c/w Strain urine for calculi  - Urine cx negative  - Urology reccs appreciated  - pt is clinically stable for discharge ; encouraged to liberalize water intake, exercise

## 2023-10-08 NOTE — DISCHARGE NOTE PROVIDER - CARE PROVIDERS DIRECT ADDRESSES
[email or web address]

## 2023-10-08 NOTE — PROGRESS NOTE ADULT - ASSESSMENT
32F w/Hx of s/p gastric sleeve performed 5 weeks ago at [place] presenting with difficulty eating, nausea and L flank pain radiating to her groin. 32F w/Hx of s/p gastric sleeve performed 5 weeks ago at [place] presenting with difficulty eating, nausea and L flank pain radiating to her groin. 32F w/Hx of s/p gastric sleeve performed 5 weeks ago at [place] presenting with difficulty eating, nausea and L flank pain radiating to her groin.

## 2023-10-08 NOTE — PROGRESS NOTE ADULT - TIME BILLING
case complexity and discharge planning. Pt is clinically stable for discharge. She is to f/up with PCP within 1 week.     d/w JADEN Shen

## 2023-10-08 NOTE — PROGRESS NOTE ADULT - PROBLEM SELECTOR PLAN 2
- Pt with recent sleeve placed in [facility]  - Reports 40 pound weight loss and resolution of previous HTN and HLD  - c/w home pantoprazole - Pt with recent sleeve placed in [facility]  - Reports 40 pound weight loss and resolution of previous HTN and HLD  - c/w home pantoprazole - Pt with recent sleeve placed in [facility]  - Reports 40 pound weight loss and resolution of previous HTN and HLD  - c/w home pantoprazole

## 2023-10-08 NOTE — DISCHARGE NOTE PROVIDER - HOSPITAL COURSE
HPI:  32F w/Hx of s/p gastric sleeve performed 5 weeks ago at [place] presenting with difficulty eating, nausea and L flank pain radiating to her groin. Patient has never had pain like this before and it has worsened over the last 2-3 days. Currently, patient has been unable to tolerate PO. On exam, pt reports improvement of pain and nausea and willingness to attempt clear liquid diet. Reports she has lost 40 pounds since sleeve placement and has developed GERD and uses pantoprazole PRN. She used to be on medication for HTN and HLD but her blood-work has improved since weight loss and she is no longer on any other medication. Reports hematuria bt also just completed her menstrual period yesterday. Reports constipation and has not had a BM in over 3 days. Patient denies fever, chills, chest pain, SOB, headache, dizziness, constipation, dysuria. (06 Oct 2023 07:21)    Hospital course---> Admitted with Ureteral stone, urology consulted; Pain medication as needed- Cr now 0.9; -Continue medical expulsive therapy with PO pain control  -Flomax 0.4mg qHS x30 days. s/o IVF, Pain now resolved. -Senna, colace, Miralax started for constipation.   Dr. Landry has medically cleared pt for discharge       Discharge Diagnoses:  Ureteral stone   Hydronephrosis   constipation                 .   HPI:  32F w/Hx of s/p gastric sleeve performed 5 weeks ago at [place] presenting with difficulty eating, nausea and L flank pain radiating to her groin. Patient has never had pain like this before and it has worsened over the last 2-3 days. Currently, patient has been unable to tolerate PO. On exam, pt reports improvement of pain and nausea and willingness to attempt clear liquid diet. Reports she has lost 40 pounds since sleeve placement and has developed GERD and uses pantoprazole PRN. She used to be on medication for HTN and HLD but her blood-work has improved since weight loss and she is no longer on any other medication. Reports hematuria bt also just completed her menstrual period yesterday. Reports constipation and has not had a BM in over 3 days. Patient denies fever, chills, chest pain, SOB, headache, dizziness, constipation, dysuria. (06 Oct 2023 07:21)    Hospital course---> Admitted with Ureteral stone, urology consulted; Pain medication as needed- Cr now 0.9; -Continue medical expulsive therapy with PO pain control  -Flomax 0.4mg qHS x30 days. s/o IVF, Pain now resolved. -Senna, colace, Miralax started for constipation.   Dr. Landry has medically cleared pt for discharge       Discharge Diagnoses:  Ureteral stone   Hydronephrosis   constipation                 .   HPI:  32F w/Hx of s/p gastric sleeve performed 5 weeks ago at [place] presenting with difficulty eating, nausea and L flank pain radiating to her groin. Patient has never had pain like this before and it has worsened over the last 2-3 days. Currently, patient has been unable to tolerate PO. On exam, pt reports improvement of pain and nausea and willingness to attempt clear liquid diet. Reports she has lost 40 pounds since sleeve placement and has developed GERD and uses pantoprazole PRN. She used to be on medication for HTN and HLD but her blood-work has improved since weight loss and she is no longer on any other medication. Reports hematuria bt also just completed her menstrual period yesterday. Reports constipation and has not had a BM in over 3 days. Patient denies fever, chills, chest pain, SOB, headache, dizziness, constipation, dysuria. (06 Oct 2023 07:21)    Hospital course---> Admitted with Ureteral stone, urology consulted; Pain medication as needed- Cr now 0.9; -Continue medical expulsive therapy with PO pain control  -Flomax 0.4mg qHS x30 days. s/o IVF, Pain now resolved. -Senna, colace, Miralax started for constipation.   Dr. Landry has medically cleared pt for discharge       Discharge Diagnoses:  Ureteral stone   Hydronephrosis   constipation                 .

## 2023-10-08 NOTE — PROGRESS NOTE ADULT - SUBJECTIVE AND OBJECTIVE BOX
Patient is a 32y old  Female who presents with a chief complaint of Renal Calculus (08 Oct 2023 11:30)      SUBJECTIVE / OVERNIGHT EVENTS:  Pt seen and examined. No acute events overnight. She denies abd pain, n/v, fever/chills. Yet to pass kidney stone.    MEDICATIONS  (STANDING):  chlorhexidine 2% Cloths 1 Application(s) Topical daily  influenza   Vaccine 0.5 milliLiter(s) IntraMuscular once  lactated ringers. 1000 milliLiter(s) (150 mL/Hr) IV Continuous <Continuous>  naloxone Injectable 0.4 milliGRAM(s) IV Push once  polyethylene glycol 3350 17 Gram(s) Oral daily  senna 2 Tablet(s) Oral at bedtime  tamsulosin 0.4 milliGRAM(s) Oral at bedtime    MEDICATIONS  (PRN):  bisacodyl 5 milliGRAM(s) Oral daily PRN Constipation  melatonin 3 milliGRAM(s) Oral at bedtime PRN Insomnia  morphine  - Injectable 2 milliGRAM(s) IV Push every 4 hours PRN Moderate Pain (4 - 6)  morphine  - Injectable 4 milliGRAM(s) IV Push every 4 hours PRN Severe Pain (7 - 10)      Vital Signs Last 24 Hrs  T(C): 36.1 (08 Oct 2023 08:00), Max: 36.9 (07 Oct 2023 23:07)  T(F): 97 (08 Oct 2023 08:00), Max: 98.5 (07 Oct 2023 23:07)  HR: 55 (08 Oct 2023 08:00) (45 - 55)  BP: 118/83 (08 Oct 2023 08:00) (112/77 - 118/83)  BP(mean): --  RR: 18 (08 Oct 2023 08:00) (18 - 18)  SpO2: 99% (08 Oct 2023 08:00) (94% - 99%)    Parameters below as of 08 Oct 2023 08:00  Patient On (Oxygen Delivery Method): room air      CAPILLARY BLOOD GLUCOSE        I&O's Summary    07 Oct 2023 07:01  -  08 Oct 2023 07:00  --------------------------------------------------------  IN: 1000 mL / OUT: 800 mL / NET: 200 mL        PHYSICAL EXAM:  GENERAL: NAD, well-groomed  HEAD:  Atraumatic, Normocephalic  EYES:  conjunctiva and sclera clear  NECK: Supple, No JVD  CHEST/LUNG: Clear to auscultation bilaterally; No wheeze  HEART: Regular rate and rhythm; No murmurs, rubs, or gallops  ABDOMEN: Soft, Nontender, Nondistended; Bowel sounds present  EXTREMITIES:  2+ Peripheral Pulses, No clubbing, cyanosis, or edema  PSYCH: AAOx3  NEUROLOGY: non-focal  SKIN: No rashes or lesions    LABS:                        11.5   8.94  )-----------( 193      ( 06 Oct 2023 14:24 )             33.4     10-07    139  |  102  |  9   ----------------------------<  80  3.7   |  25  |  0.90    Ca    8.9      07 Oct 2023 00:49  Mg     1.7     10-06            Urinalysis Basic - ( 07 Oct 2023 00:49 )    Color: x / Appearance: x / SG: x / pH: x  Gluc: 80 mg/dL / Ketone: x  / Bili: x / Urobili: x   Blood: x / Protein: x / Nitrite: x   Leuk Esterase: x / RBC: x / WBC x   Sq Epi: x / Non Sq Epi: x / Bacteria: x

## 2023-10-08 NOTE — DISCHARGE NOTE PROVIDER - PROVIDER TOKENS
PROVIDER:[TOKEN:[[id number]]] PROVIDER:[TOKEN:[[id number]]] PROVIDER:[TOKEN:[[id number]]]

## 2023-10-08 NOTE — DISCHARGE NOTE PROVIDER - NSDCCPCAREPLAN_GEN_ALL_CORE_FT
PRINCIPAL DISCHARGE DIAGNOSIS  Diagnosis: Ureteral stone with hydronephrosis  Assessment and Plan of Treatment: Ureteral stone  - Cr now 0.9  -Flomax 0.4mg qHS x30 days  -valeria Mathew . Chan, David Yat  [facility]  Urology  [address] [place] [address], [address] [address]  Phone: [phone]  Fax: [phone]        SECONDARY DISCHARGE DIAGNOSES  Diagnosis: Moderate dehydration  Assessment and Plan of Treatment: Resolved    Diagnosis: Acute hypokalemia  Assessment and Plan of Treatment: Resolved     PRINCIPAL DISCHARGE DIAGNOSIS  Diagnosis: Ureteral stone with hydronephrosis  Assessment and Plan of Treatment: Ureteral stone  - Cr now 0.9  -Flomax 0.4mg qHS x30 days  -valeria Mathew . Chan, David Yat  [facility]  Urology  [address] [place] [address], [address] [address]  Phone: [phone]  Fax: [phone]        SECONDARY DISCHARGE DIAGNOSES  Diagnosis: Moderate dehydration  Assessment and Plan of Treatment: Resolved    Diagnosis: Acute hypokalemia  Assessment and Plan of Treatment: Resolved     PRINCIPAL DISCHARGE DIAGNOSIS  Diagnosis: Ureteral stone with hydronephrosis  Assessment and Plan of Treatment: Ureteral stone  - Cr now 0.9  -Flomax 0.4mg qHS x30 days  -valeria Mathew . Chan, David Yat  [facility]  Urology  [address] [place] [address], [address] [address]  Phone: [phone]  Fax: [phone]        SECONDARY DISCHARGE DIAGNOSES  Diagnosis: Moderate dehydration  Assessment and Plan of Treatment: Resolved    Diagnosis: Acute hypokalemia  Assessment and Plan of Treatment: Resolved

## 2023-10-08 NOTE — DISCHARGE NOTE NURSING/CASE MANAGEMENT/SOCIAL WORK - NSDCPEFALRISK_GEN_ALL_CORE
For information on Fall & Injury Prevention, visit: [email or web address] OR  [email or web address] OR  https://www.cdc.gov/steadi/patient.html For information on Fall & Injury Prevention, visit: [email or web address]://[email or web address]/[email or web address] OR  [email or web address]://[email or web address]/[email or web address]-[email or web address]-[email or web address]-avoid-injury OR  https://www.cdc.gov/steadi/patient.html For information on Fall & Injury Prevention, visit: [email or web address] OR  [email or web address]://[email or web address]-injury OR  https://www.cdc.gov/steadi/patient.html

## 2023-10-08 NOTE — DISCHARGE NOTE PROVIDER - CARE PROVIDER_API CALL
Moiz Hodges  [facility]  Urology  [address] [place] [address], [address]  [place] [address]  Phone: [phone]  Fax: [phone]  Follow Up Time:    Moiz Hodges  [facility]  Urology  [address] [place] [address], [address]  [place] [address]  Phone: [phone]  Fax: [phone]  Follow Up Time:    Moiz Hodges  [facility]  Urology  [address] [place] [address], [address]  [place] [address]  Phone: [phone]  Fax: [phone]  Follow Up Time:

## 2023-10-08 NOTE — DISCHARGE NOTE PROVIDER - NSDCMRMEDTOKEN_GEN_ALL_CORE_FT
Colace 100 mg oral capsule: 1 cap(s) orally 2 times a day  Flomax 0.4 mg oral capsule: 1 cap(s) orally once a day  pantoprazole 40 mg oral delayed release tablet: 1 tab(s) orally once a day  senna leaf extract oral tablet: 2 tab(s) orally once a day (at bedtime)

## 2023-10-08 NOTE — DISCHARGE NOTE NURSING/CASE MANAGEMENT/SOCIAL WORK - PATIENT PORTAL LINK FT
You can access the FollowMyHealth Patient Portal offered by [facility] by registering at the following website: [email or web address]. By joining [employer]’s FollowMyHealth portal, you will also be able to view your health information using other applications (apps) compatible with our system. You can access the FollowMyHealth Patient Portal offered by [facility] by registering at the following website: [email or web address]. By joining [employer]’s FollowMyHealth portal, you will also be able to view your health information using other applications (apps) compatible with our system. You can access the FollowMyHealth Patient Portal offered by [facility] by registering at the following website: [email or web address]. By joining [employer]’s FollowMyHealth portal, you will also be able to view your health information using other applications (apps) compatible with our system.

## 2023-12-27 ENCOUNTER — EMERGENCY (EMERGENCY)
Facility: HOSPITAL | Age: 33
LOS: 0 days | Discharge: HOME | End: 2023-12-28
Attending: EMERGENCY MEDICINE
Payer: MEDICAID

## 2023-12-27 VITALS
RESPIRATION RATE: 17 BRPM | HEIGHT: 65 IN | SYSTOLIC BLOOD PRESSURE: 118 MMHG | OXYGEN SATURATION: 100 % | WEIGHT: 134.92 LBS | DIASTOLIC BLOOD PRESSURE: 59 MMHG | HEART RATE: 69 BPM

## 2023-12-27 DIAGNOSIS — R11.2 NAUSEA WITH VOMITING, UNSPECIFIED: ICD-10-CM

## 2023-12-27 DIAGNOSIS — Z87.442 PERSONAL HISTORY OF URINARY CALCULI: ICD-10-CM

## 2023-12-27 DIAGNOSIS — Z98.891 HISTORY OF UTERINE SCAR FROM PREVIOUS SURGERY: ICD-10-CM

## 2023-12-27 DIAGNOSIS — R10.31 RIGHT LOWER QUADRANT PAIN: ICD-10-CM

## 2023-12-27 DIAGNOSIS — Z90.3 ACQUIRED ABSENCE OF STOMACH [PART OF]: ICD-10-CM

## 2023-12-27 DIAGNOSIS — Z87.19 PERSONAL HISTORY OF OTHER DISEASES OF THE DIGESTIVE SYSTEM: ICD-10-CM

## 2023-12-27 DIAGNOSIS — Z90.3 ACQUIRED ABSENCE OF STOMACH [PART OF]: Chronic | ICD-10-CM

## 2023-12-27 PROCEDURE — 99285 EMERGENCY DEPT VISIT HI MDM: CPT | Mod: 25

## 2023-12-27 NOTE — ED ADULT TRIAGE NOTE - CHIEF COMPLAINT QUOTE
BIBA,  pt c/o L-LQ pain radiating to back  +vomiting.  (PMH-kidney stones, Gastric-Sleeve 8/2023).  EMS gave fentayl 61mg IVP, zofran 4mg IVP.  #18guage R-AC

## 2023-12-28 VITALS
RESPIRATION RATE: 19 BRPM | DIASTOLIC BLOOD PRESSURE: 64 MMHG | OXYGEN SATURATION: 99 % | HEART RATE: 60 BPM | SYSTOLIC BLOOD PRESSURE: 99 MMHG | TEMPERATURE: 98 F

## 2023-12-28 LAB
ALBUMIN SERPL ELPH-MCNC: 3.4 G/DL — SIGNIFICANT CHANGE UP (ref 3.3–5)
ALBUMIN SERPL ELPH-MCNC: 3.4 G/DL — SIGNIFICANT CHANGE UP (ref 3.3–5)
ALP SERPL-CCNC: 68 U/L — SIGNIFICANT CHANGE UP (ref 40–120)
ALP SERPL-CCNC: 68 U/L — SIGNIFICANT CHANGE UP (ref 40–120)
ALT FLD-CCNC: 90 U/L — HIGH (ref 12–78)
ALT FLD-CCNC: 90 U/L — HIGH (ref 12–78)
ANION GAP SERPL CALC-SCNC: 8 MMOL/L — SIGNIFICANT CHANGE UP (ref 5–17)
ANION GAP SERPL CALC-SCNC: 8 MMOL/L — SIGNIFICANT CHANGE UP (ref 5–17)
APPEARANCE UR: CLEAR — SIGNIFICANT CHANGE UP
APPEARANCE UR: CLEAR — SIGNIFICANT CHANGE UP
APTT BLD: 26 SEC — SIGNIFICANT CHANGE UP (ref 24.5–35.6)
APTT BLD: 26 SEC — SIGNIFICANT CHANGE UP (ref 24.5–35.6)
AST SERPL-CCNC: 98 U/L — HIGH (ref 15–37)
AST SERPL-CCNC: 98 U/L — HIGH (ref 15–37)
BACTERIA # UR AUTO: ABNORMAL /HPF
BACTERIA # UR AUTO: ABNORMAL /HPF
BASOPHILS # BLD AUTO: 0.05 K/UL — SIGNIFICANT CHANGE UP (ref 0–0.2)
BASOPHILS # BLD AUTO: 0.05 K/UL — SIGNIFICANT CHANGE UP (ref 0–0.2)
BASOPHILS NFR BLD AUTO: 0.5 % — SIGNIFICANT CHANGE UP (ref 0–2)
BASOPHILS NFR BLD AUTO: 0.5 % — SIGNIFICANT CHANGE UP (ref 0–2)
BILIRUB SERPL-MCNC: 0.3 MG/DL — SIGNIFICANT CHANGE UP (ref 0.2–1.2)
BILIRUB SERPL-MCNC: 0.3 MG/DL — SIGNIFICANT CHANGE UP (ref 0.2–1.2)
BILIRUB UR-MCNC: NEGATIVE — SIGNIFICANT CHANGE UP
BILIRUB UR-MCNC: NEGATIVE — SIGNIFICANT CHANGE UP
BLD GP AB SCN SERPL QL: SIGNIFICANT CHANGE UP
BLD GP AB SCN SERPL QL: SIGNIFICANT CHANGE UP
BUN SERPL-MCNC: 15 MG/DL — SIGNIFICANT CHANGE UP (ref 7–23)
BUN SERPL-MCNC: 15 MG/DL — SIGNIFICANT CHANGE UP (ref 7–23)
CALCIUM SERPL-MCNC: 9 MG/DL — SIGNIFICANT CHANGE UP (ref 8.5–10.1)
CALCIUM SERPL-MCNC: 9 MG/DL — SIGNIFICANT CHANGE UP (ref 8.5–10.1)
CHLORIDE SERPL-SCNC: 112 MMOL/L — HIGH (ref 96–108)
CHLORIDE SERPL-SCNC: 112 MMOL/L — HIGH (ref 96–108)
CO2 SERPL-SCNC: 22 MMOL/L — SIGNIFICANT CHANGE UP (ref 22–31)
CO2 SERPL-SCNC: 22 MMOL/L — SIGNIFICANT CHANGE UP (ref 22–31)
COLOR SPEC: YELLOW — SIGNIFICANT CHANGE UP
COLOR SPEC: YELLOW — SIGNIFICANT CHANGE UP
CREAT SERPL-MCNC: 0.76 MG/DL — SIGNIFICANT CHANGE UP (ref 0.5–1.3)
CREAT SERPL-MCNC: 0.76 MG/DL — SIGNIFICANT CHANGE UP (ref 0.5–1.3)
DIFF PNL FLD: ABNORMAL
DIFF PNL FLD: ABNORMAL
EGFR: 107 ML/MIN/1.73M2 — SIGNIFICANT CHANGE UP
EGFR: 107 ML/MIN/1.73M2 — SIGNIFICANT CHANGE UP
EOSINOPHIL # BLD AUTO: 0.1 K/UL — SIGNIFICANT CHANGE UP (ref 0–0.5)
EOSINOPHIL # BLD AUTO: 0.1 K/UL — SIGNIFICANT CHANGE UP (ref 0–0.5)
EOSINOPHIL NFR BLD AUTO: 1 % — SIGNIFICANT CHANGE UP (ref 0–6)
EOSINOPHIL NFR BLD AUTO: 1 % — SIGNIFICANT CHANGE UP (ref 0–6)
EPI CELLS # UR: PRESENT
EPI CELLS # UR: PRESENT
GLUCOSE SERPL-MCNC: 160 MG/DL — HIGH (ref 70–99)
GLUCOSE SERPL-MCNC: 160 MG/DL — HIGH (ref 70–99)
GLUCOSE UR QL: NEGATIVE MG/DL — SIGNIFICANT CHANGE UP
GLUCOSE UR QL: NEGATIVE MG/DL — SIGNIFICANT CHANGE UP
HCG SERPL-ACNC: <1 MIU/ML — SIGNIFICANT CHANGE UP
HCG SERPL-ACNC: <1 MIU/ML — SIGNIFICANT CHANGE UP
HCT VFR BLD CALC: 37.3 % — SIGNIFICANT CHANGE UP (ref 34.5–45)
HCT VFR BLD CALC: 37.3 % — SIGNIFICANT CHANGE UP (ref 34.5–45)
HGB BLD-MCNC: 12.8 G/DL — SIGNIFICANT CHANGE UP (ref 11.5–15.5)
HGB BLD-MCNC: 12.8 G/DL — SIGNIFICANT CHANGE UP (ref 11.5–15.5)
IMM GRANULOCYTES NFR BLD AUTO: 0.6 % — SIGNIFICANT CHANGE UP (ref 0–0.9)
IMM GRANULOCYTES NFR BLD AUTO: 0.6 % — SIGNIFICANT CHANGE UP (ref 0–0.9)
INR BLD: 0.85 RATIO — SIGNIFICANT CHANGE UP (ref 0.85–1.18)
INR BLD: 0.85 RATIO — SIGNIFICANT CHANGE UP (ref 0.85–1.18)
KETONES UR-MCNC: ABNORMAL MG/DL
KETONES UR-MCNC: ABNORMAL MG/DL
LACTATE SERPL-SCNC: 1.9 MMOL/L — SIGNIFICANT CHANGE UP (ref 0.7–2)
LACTATE SERPL-SCNC: 1.9 MMOL/L — SIGNIFICANT CHANGE UP (ref 0.7–2)
LEUKOCYTE ESTERASE UR-ACNC: NEGATIVE — SIGNIFICANT CHANGE UP
LEUKOCYTE ESTERASE UR-ACNC: NEGATIVE — SIGNIFICANT CHANGE UP
LIDOCAIN IGE QN: 86 U/L — HIGH (ref 13–75)
LIDOCAIN IGE QN: 86 U/L — HIGH (ref 13–75)
LYMPHOCYTES # BLD AUTO: 1.44 K/UL — SIGNIFICANT CHANGE UP (ref 1–3.3)
LYMPHOCYTES # BLD AUTO: 1.44 K/UL — SIGNIFICANT CHANGE UP (ref 1–3.3)
LYMPHOCYTES # BLD AUTO: 14.6 % — SIGNIFICANT CHANGE UP (ref 13–44)
LYMPHOCYTES # BLD AUTO: 14.6 % — SIGNIFICANT CHANGE UP (ref 13–44)
MCHC RBC-ENTMCNC: 31.8 PG — SIGNIFICANT CHANGE UP (ref 27–34)
MCHC RBC-ENTMCNC: 31.8 PG — SIGNIFICANT CHANGE UP (ref 27–34)
MCHC RBC-ENTMCNC: 34.3 G/DL — SIGNIFICANT CHANGE UP (ref 32–36)
MCHC RBC-ENTMCNC: 34.3 G/DL — SIGNIFICANT CHANGE UP (ref 32–36)
MCV RBC AUTO: 92.6 FL — SIGNIFICANT CHANGE UP (ref 80–100)
MCV RBC AUTO: 92.6 FL — SIGNIFICANT CHANGE UP (ref 80–100)
MONOCYTES # BLD AUTO: 0.57 K/UL — SIGNIFICANT CHANGE UP (ref 0–0.9)
MONOCYTES # BLD AUTO: 0.57 K/UL — SIGNIFICANT CHANGE UP (ref 0–0.9)
MONOCYTES NFR BLD AUTO: 5.8 % — SIGNIFICANT CHANGE UP (ref 2–14)
MONOCYTES NFR BLD AUTO: 5.8 % — SIGNIFICANT CHANGE UP (ref 2–14)
NEUTROPHILS # BLD AUTO: 7.63 K/UL — HIGH (ref 1.8–7.4)
NEUTROPHILS # BLD AUTO: 7.63 K/UL — HIGH (ref 1.8–7.4)
NEUTROPHILS NFR BLD AUTO: 77.5 % — HIGH (ref 43–77)
NEUTROPHILS NFR BLD AUTO: 77.5 % — HIGH (ref 43–77)
NITRITE UR-MCNC: NEGATIVE — SIGNIFICANT CHANGE UP
NITRITE UR-MCNC: NEGATIVE — SIGNIFICANT CHANGE UP
NRBC # BLD: 0 /100 WBCS — SIGNIFICANT CHANGE UP (ref 0–0)
NRBC # BLD: 0 /100 WBCS — SIGNIFICANT CHANGE UP (ref 0–0)
PH UR: 6 — SIGNIFICANT CHANGE UP (ref 5–8)
PH UR: 6 — SIGNIFICANT CHANGE UP (ref 5–8)
PLATELET # BLD AUTO: 218 K/UL — SIGNIFICANT CHANGE UP (ref 150–400)
PLATELET # BLD AUTO: 218 K/UL — SIGNIFICANT CHANGE UP (ref 150–400)
POTASSIUM SERPL-MCNC: 3 MMOL/L — LOW (ref 3.5–5.3)
POTASSIUM SERPL-MCNC: 3 MMOL/L — LOW (ref 3.5–5.3)
POTASSIUM SERPL-SCNC: 3 MMOL/L — LOW (ref 3.5–5.3)
POTASSIUM SERPL-SCNC: 3 MMOL/L — LOW (ref 3.5–5.3)
PROT SERPL-MCNC: 7.1 GM/DL — SIGNIFICANT CHANGE UP (ref 6–8.3)
PROT SERPL-MCNC: 7.1 GM/DL — SIGNIFICANT CHANGE UP (ref 6–8.3)
PROT UR-MCNC: SIGNIFICANT CHANGE UP MG/DL
PROT UR-MCNC: SIGNIFICANT CHANGE UP MG/DL
PROTHROM AB SERPL-ACNC: 10.3 SEC — SIGNIFICANT CHANGE UP (ref 9.5–13)
PROTHROM AB SERPL-ACNC: 10.3 SEC — SIGNIFICANT CHANGE UP (ref 9.5–13)
RBC # BLD: 4.03 M/UL — SIGNIFICANT CHANGE UP (ref 3.8–5.2)
RBC # BLD: 4.03 M/UL — SIGNIFICANT CHANGE UP (ref 3.8–5.2)
RBC # FLD: 12.9 % — SIGNIFICANT CHANGE UP (ref 10.3–14.5)
RBC # FLD: 12.9 % — SIGNIFICANT CHANGE UP (ref 10.3–14.5)
RBC CASTS # UR COMP ASSIST: SIGNIFICANT CHANGE UP /HPF (ref 0–4)
RBC CASTS # UR COMP ASSIST: SIGNIFICANT CHANGE UP /HPF (ref 0–4)
SODIUM SERPL-SCNC: 142 MMOL/L — SIGNIFICANT CHANGE UP (ref 135–145)
SODIUM SERPL-SCNC: 142 MMOL/L — SIGNIFICANT CHANGE UP (ref 135–145)
SP GR SPEC: 1.01 — SIGNIFICANT CHANGE UP (ref 1–1.03)
SP GR SPEC: 1.01 — SIGNIFICANT CHANGE UP (ref 1–1.03)
UROBILINOGEN FLD QL: 1 MG/DL — SIGNIFICANT CHANGE UP (ref 0.2–1)
UROBILINOGEN FLD QL: 1 MG/DL — SIGNIFICANT CHANGE UP (ref 0.2–1)
WBC # BLD: 9.72 K/UL — SIGNIFICANT CHANGE UP (ref 3.8–10.5)
WBC # BLD: 9.72 K/UL — SIGNIFICANT CHANGE UP (ref 3.8–10.5)
WBC # FLD AUTO: 9.72 K/UL — SIGNIFICANT CHANGE UP (ref 3.8–10.5)
WBC # FLD AUTO: 9.72 K/UL — SIGNIFICANT CHANGE UP (ref 3.8–10.5)
WBC UR QL: SIGNIFICANT CHANGE UP /HPF (ref 0–5)
WBC UR QL: SIGNIFICANT CHANGE UP /HPF (ref 0–5)

## 2023-12-28 PROCEDURE — 76830 TRANSVAGINAL US NON-OB: CPT | Mod: 26

## 2023-12-28 PROCEDURE — 74177 CT ABD & PELVIS W/CONTRAST: CPT | Mod: 26,MA

## 2023-12-28 PROCEDURE — 76705 ECHO EXAM OF ABDOMEN: CPT | Mod: 26

## 2023-12-28 PROCEDURE — 78226 HEPATOBILIARY SYSTEM IMAGING: CPT | Mod: 26,MA

## 2023-12-28 PROCEDURE — 99284 EMERGENCY DEPT VISIT MOD MDM: CPT

## 2023-12-28 PROCEDURE — 93010 ELECTROCARDIOGRAM REPORT: CPT

## 2023-12-28 RX ORDER — CEFUROXIME AXETIL 250 MG
1 TABLET ORAL
Qty: 14 | Refills: 0
Start: 2023-12-28 | End: 2024-01-03

## 2023-12-28 RX ORDER — ACETAMINOPHEN 500 MG
975 TABLET ORAL ONCE
Refills: 0 | Status: COMPLETED | OUTPATIENT
Start: 2023-12-28 | End: 2023-12-28

## 2023-12-28 RX ORDER — METOCLOPRAMIDE HCL 10 MG
10 TABLET ORAL ONCE
Refills: 0 | Status: COMPLETED | OUTPATIENT
Start: 2023-12-28 | End: 2023-12-28

## 2023-12-28 RX ORDER — SODIUM CHLORIDE 9 MG/ML
1000 INJECTION INTRAMUSCULAR; INTRAVENOUS; SUBCUTANEOUS ONCE
Refills: 0 | Status: COMPLETED | OUTPATIENT
Start: 2023-12-28 | End: 2023-12-28

## 2023-12-28 RX ORDER — POTASSIUM CHLORIDE 20 MEQ
40 PACKET (EA) ORAL ONCE
Refills: 0 | Status: COMPLETED | OUTPATIENT
Start: 2023-12-28 | End: 2023-12-28

## 2023-12-28 RX ORDER — ACETAMINOPHEN 500 MG
2 TABLET ORAL
Qty: 30 | Refills: 0
Start: 2023-12-28 | End: 2024-01-01

## 2023-12-28 RX ORDER — TRAMADOL HYDROCHLORIDE 50 MG/1
1 TABLET ORAL
Qty: 15 | Refills: 0
Start: 2023-12-28 | End: 2024-01-01

## 2023-12-28 RX ORDER — IOHEXOL 300 MG/ML
30 INJECTION, SOLUTION INTRAVENOUS ONCE
Refills: 0 | Status: COMPLETED | OUTPATIENT
Start: 2023-12-28 | End: 2023-12-28

## 2023-12-28 RX ORDER — PIPERACILLIN AND TAZOBACTAM 4; .5 G/20ML; G/20ML
3.38 INJECTION, POWDER, LYOPHILIZED, FOR SOLUTION INTRAVENOUS ONCE
Refills: 0 | Status: COMPLETED | OUTPATIENT
Start: 2023-12-28 | End: 2023-12-28

## 2023-12-28 RX ORDER — CEFTRIAXONE 500 MG/1
1000 INJECTION, POWDER, FOR SOLUTION INTRAMUSCULAR; INTRAVENOUS ONCE
Refills: 0 | Status: COMPLETED | OUTPATIENT
Start: 2023-12-28 | End: 2023-12-28

## 2023-12-28 RX ORDER — SODIUM CHLORIDE 9 MG/ML
1000 INJECTION, SOLUTION INTRAVENOUS ONCE
Refills: 0 | Status: COMPLETED | OUTPATIENT
Start: 2023-12-28 | End: 2023-12-28

## 2023-12-28 RX ORDER — FAMOTIDINE 10 MG/ML
20 INJECTION INTRAVENOUS ONCE
Refills: 0 | Status: COMPLETED | OUTPATIENT
Start: 2023-12-28 | End: 2023-12-28

## 2023-12-28 RX ORDER — MORPHINE SULFATE 50 MG/1
6 CAPSULE, EXTENDED RELEASE ORAL ONCE
Refills: 0 | Status: DISCONTINUED | OUTPATIENT
Start: 2023-12-28 | End: 2023-12-28

## 2023-12-28 RX ORDER — KETOROLAC TROMETHAMINE 30 MG/ML
30 SYRINGE (ML) INJECTION ONCE
Refills: 0 | Status: DISCONTINUED | OUTPATIENT
Start: 2023-12-28 | End: 2023-12-28

## 2023-12-28 RX ADMIN — IOHEXOL 30 MILLILITER(S): 300 INJECTION, SOLUTION INTRAVENOUS at 02:01

## 2023-12-28 RX ADMIN — Medication 30 MILLIGRAM(S): at 13:17

## 2023-12-28 RX ADMIN — Medication 10 MILLIGRAM(S): at 01:35

## 2023-12-28 RX ADMIN — MORPHINE SULFATE 6 MILLIGRAM(S): 50 CAPSULE, EXTENDED RELEASE ORAL at 01:57

## 2023-12-28 RX ADMIN — SODIUM CHLORIDE 1000 MILLILITER(S): 9 INJECTION INTRAMUSCULAR; INTRAVENOUS; SUBCUTANEOUS at 01:06

## 2023-12-28 RX ADMIN — CEFTRIAXONE 100 MILLIGRAM(S): 500 INJECTION, POWDER, FOR SOLUTION INTRAMUSCULAR; INTRAVENOUS at 14:22

## 2023-12-28 RX ADMIN — MORPHINE SULFATE 6 MILLIGRAM(S): 50 CAPSULE, EXTENDED RELEASE ORAL at 02:02

## 2023-12-28 RX ADMIN — SODIUM CHLORIDE 1000 MILLILITER(S): 9 INJECTION, SOLUTION INTRAVENOUS at 06:43

## 2023-12-28 RX ADMIN — FAMOTIDINE 20 MILLIGRAM(S): 10 INJECTION INTRAVENOUS at 01:03

## 2023-12-28 RX ADMIN — Medication 40 MILLIEQUIVALENT(S): at 03:42

## 2023-12-28 RX ADMIN — PIPERACILLIN AND TAZOBACTAM 200 GRAM(S): 4; .5 INJECTION, POWDER, LYOPHILIZED, FOR SOLUTION INTRAVENOUS at 01:05

## 2023-12-28 NOTE — CONSULT NOTE ADULT - SUBJECTIVE AND OBJECTIVE BOX
HPI:  34yo F with PMH of gall stones, kidney stones, and PSH of gastric sleeve, , presents to ED c/o abdominal pain x1 day. Per patient, her pain began suddenly around 10pm last night and was not associated with eating. Her pain is located in RLQ, and radiates RUQ and back. Her pain is associated nausea and NBNB emesis. She denies fever, chills, diarrhea, CP, SOB.     PAST MEDICAL & SURGICAL HISTORY:  No pertinent past medical history  H/O gastric sleeve    Review of Systems:  contained within HPI    MEDICATIONS  (STANDING):    MEDICATIONS  (PRN):      Allergies    No Known Allergies    Intolerances    SOCIAL HISTORY          Smoking: Yes [ ]  No [X]   ______pk yrs          ETOH  Yes [ ]  No [X]  Social [ ]          DRUGS:  Yes [ ]  No [X]  if so what______________    FAMILY HISTORY:  Unknown    Vital Signs Last 24 Hrs  T(C): 36.7 (28 Dec 2023 07:50), Max: 36.8 (28 Dec 2023 03:14)  T(F): 98 (28 Dec 2023 07:50), Max: 98.3 (28 Dec 2023 03:14)  HR: 64 (28 Dec 2023 07:50) (59 - 69)  BP: 99/63 (28 Dec 2023 07:50) (91/59 - 118/59)  RR: 18 (28 Dec 2023 07:50) (17 - 18)  SpO2: 99% (28 Dec 2023 07:50) (98% - 100%)    Parameters below as of 28 Dec 2023 07:50  Patient On (Oxygen Delivery Method): room air    Physical Exam:  General:  Appears stated age, well-groomed, well-nourished, no distress  Eyes: EOMI  HENT: NCAT. WNL, no JVD  Chest: clear breath sounds  Cardiovascular: Regular rate & rhythm  Abdomen: soft, softly ttp at RUQ and RLQ, non distended. No rebound, no guarding. No Denney's sign  Extremities: non edematous  Skin: No rash  Musculoskeletal: normal strength  Neuro/Psych: AAO x3      LABS:                        12.8   9.72  )-----------( 218      ( 28 Dec 2023 00:40 )             37.3         142  |  112<H>  |  15  ----------------------------<  160<H>  3.0<L>   |  22  |  0.76    Ca    9.0      28 Dec 2023 00:40    TPro  7.1  /  Alb  3.4  /  TBili  0.3  /  DBili  x   /  AST  98<H>  /  ALT  90<H>  /  AlkPhos  68      PT/INR - ( 28 Dec 2023 00:40 )   PT: 10.3 sec;   INR: 0.85 ratio         PTT - ( 28 Dec 2023 00:40 )  PTT:26.0 sec  Urinalysis Basic - ( 28 Dec 2023 00:40 )    Color: x / Appearance: x / SG: x / pH: x  Gluc: 160 mg/dL / Ketone: x  / Bili: x / Urobili: x   Blood: x / Protein: x / Nitrite: x   Leuk Esterase: x / RBC: x / WBC x   Sq Epi: x / Non Sq Epi: x / Bacteria: x      RADIOLOGY & ADDITIONAL STUDIES:  < from: US Abdomen Limited (23 @ 06:37) >  ACC: [id number] EXAM:  US ABDOMEN LIMITED   ORDERED BY: ESTHER GARCIA     PROCEDURE DATE:  2023          INTERPRETATION:  CLINICAL INDICATION: abnormal GB on CT, n/v, abd pain    TECHNIQUE: Targeted right upper quadrant ultrasound of the abdomen was   performed.    COMPARISON: 2023.    FINDINGS: This study was technically difficult due to bowel gas.    LIVER: 18.2 cm. Slightly increased echogenicity, suggesting fatty   infiltration.  BILIARY: No intrahepatic or extrahepatic biliary dilatation. Visualized   common bile duct measuring up to 0.5 cm.  GALLBLADDER: Stones. Wall measuring 0.7 cm. No obvious pericholecystic   fluid. Negative sonographic Denney sign. Patient was premedicated.  PANCREAS: Poorly visualized due to bowel gas.  RIGHT KIDNEY: 11.0 cm. No hydronephrosis.  ADDITIONAL: No ascites.    IMPRESSION:    Cholelithiasis with gallbladder wall thickening.. Unreliable sonographic   Denney sign as the patient was premedicated. If there is clinical   suspicion for acute cholecystitis, hepatobiliary scan may be obtained for   further evaluation.    < from: CT Abdomen and Pelvis w/ Oral Cont and w/ IV Cont (23 @ 04:53) >  ACC: [id number] EXAM:  CT ABDOMEN AND PELVIS OC IC   ORDERED BY: ESTHER GARCIA     PROCEDURE DATE:  2023          INTERPRETATION:  CLINICAL INFORMATION: Right lower quadrant pain    COMPARISON: CT abdomen/pelvis 10/5/2023    CONTRAST/COMPLICATIONS:  IV Contrast: Omnipaque 350  75 cc administered   2 cc discarded  Oral Contrast: NONE  Complications: None reported at time of study completion    PROCEDURE:  CT of the Abdomen and Pelvis was performed.  Sagittal and coronal reformats were performed.    FINDINGS:  LOWER CHEST: Within normal limits.    LIVER: Within normal limits.  BILE DUCTS: Normal caliber.  GALLBLADDER: Diffuse gallbladder wall thickening/edema.  SPLEEN: Within normal limits.  PANCREAS: Within normal limits.  ADRENALS: Within normal limits.  KIDNEYS/URETERS: Within normal limits.    BLADDER: Within normal limits.  REPRODUCTIVE ORGANS: Uterus and adnexa within normal limits.    BOWEL: Small hiatal hernia. Status post sleeve gastrectomy. No bowel   obstruction. Appendix is normal.  PERITONEUM: No ascites.  VESSELS: Within normal limits.  RETROPERITONEUM/LYMPH NODES: No lymphadenopathy.  ABDOMINAL WALL: Within normal limits.  BONES: Within normal limits.    IMPRESSION:  Diffuse gallbladder wall thickening/edema. Recommendcorrelation with   right upper quadrant ultrasound.    Normal appendix.    < from: NM Hepatobiliary Imaging (23 @ 11:45) >  ACC: [id number] EXAM:  NM HEPATOBILIARY IMG   ORDERED BY: SCOTT ONEIL     PROCEDURE DATE:  2023          INTERPRETATION:  RADIOPHARMACEUTICAL:  99mTc-Mebrofenin  DOSE: 3 mCi IV    CLINICAL INFORMATION: 33 year old female with abdominal pain, referred to   evaluate for acute cholecystitis    TECHNIQUE: Dynamic images of the anterior abdomen were obtained for 1   hour immediately following radiotracer injection. Static images of the   abdomen in the anterior, right anterior oblique and right lateral   projections were also obtained.    COMPARISON: No previous hepatobiliary scan for comparison    FINDINGS: There is prompt uptake of the injected radiotracer by the   hepatocytes. The gallbladder is first visualized at 20 minutes post   tracer injection and bowel activity by 35 minutes. There is normal tracer   clearance from the liver by the end of the study.    IMPRESSION: Normal hepatobiliary scan.    No scan evidence of acute cholecystitis.    A/P  34yo F with PMH of gall stones, kidney stones, and PSH of gastric sleeve, , presents to ED c/o abdominal pain x1 day. Found to have gallstones with no signs of acute cholecystitis on HIDA scan.  PO challenge   Patient stable for discharge from surgical standpoint if tolerates diet  OP f/u with Dr. Rodríguez Sutherland for elective cholecystectomy if patient wants  D/w Dr. Sutherland          HPI:  34yo F with PMH of gall stones, kidney stones, and PSH of gastric sleeve, , presents to ED c/o abdominal pain x1 day. Per patient, her pain began suddenly around 10pm last night and was not associated with eating. Her pain is located in RLQ, and radiates RUQ and back. Her pain is associated nausea and NBNB emesis. She denies fever, chills, diarrhea, CP, SOB.     PAST MEDICAL & SURGICAL HISTORY:  No pertinent past medical history  H/O gastric sleeve    Review of Systems:  contained within HPI    MEDICATIONS  (STANDING):    MEDICATIONS  (PRN):      Allergies    No Known Allergies    Intolerances    SOCIAL HISTORY          Smoking: Yes [ ]  No [X]   ______pk yrs          ETOH  Yes [ ]  No [X]  Social [ ]          DRUGS:  Yes [ ]  No [X]  if so what______________    FAMILY HISTORY:  Unknown    Vital Signs Last 24 Hrs  T(C): 36.7 (28 Dec 2023 07:50), Max: 36.8 (28 Dec 2023 03:14)  T(F): 98 (28 Dec 2023 07:50), Max: 98.3 (28 Dec 2023 03:14)  HR: 64 (28 Dec 2023 07:50) (59 - 69)  BP: 99/63 (28 Dec 2023 07:50) (91/59 - 118/59)  RR: 18 (28 Dec 2023 07:50) (17 - 18)  SpO2: 99% (28 Dec 2023 07:50) (98% - 100%)    Parameters below as of 28 Dec 2023 07:50  Patient On (Oxygen Delivery Method): room air    Physical Exam:  General:  Appears stated age, well-groomed, well-nourished, no distress  Eyes: EOMI  HENT: NCAT. WNL, no JVD  Chest: clear breath sounds  Cardiovascular: Regular rate & rhythm  Abdomen: soft, softly ttp at RUQ and RLQ, non distended. No rebound, no guarding. No Denney's sign  Extremities: non edematous  Skin: No rash  Musculoskeletal: normal strength  Neuro/Psych: AAO x3      LABS:                        12.8   9.72  )-----------( 218      ( 28 Dec 2023 00:40 )             37.3         142  |  112<H>  |  15  ----------------------------<  160<H>  3.0<L>   |  22  |  0.76    Ca    9.0      28 Dec 2023 00:40    TPro  7.1  /  Alb  3.4  /  TBili  0.3  /  DBili  x   /  AST  98<H>  /  ALT  90<H>  /  AlkPhos  68      PT/INR - ( 28 Dec 2023 00:40 )   PT: 10.3 sec;   INR: 0.85 ratio         PTT - ( 28 Dec 2023 00:40 )  PTT:26.0 sec  Urinalysis Basic - ( 28 Dec 2023 00:40 )    Color: x / Appearance: x / SG: x / pH: x  Gluc: 160 mg/dL / Ketone: x  / Bili: x / Urobili: x   Blood: x / Protein: x / Nitrite: x   Leuk Esterase: x / RBC: x / WBC x   Sq Epi: x / Non Sq Epi: x / Bacteria: x      RADIOLOGY & ADDITIONAL STUDIES:  < from: US Abdomen Limited (23 @ 06:37) >  ACC: [id number] EXAM:  US ABDOMEN LIMITED   ORDERED BY: ESTHER GARCIA     PROCEDURE DATE:  2023          INTERPRETATION:  CLINICAL INDICATION: abnormal GB on CT, n/v, abd pain    TECHNIQUE: Targeted right upper quadrant ultrasound of the abdomen was   performed.    COMPARISON: 2023.    FINDINGS: This study was technically difficult due to bowel gas.    LIVER: 18.2 cm. Slightly increased echogenicity, suggesting fatty   infiltration.  BILIARY: No intrahepatic or extrahepatic biliary dilatation. Visualized   common bile duct measuring up to 0.5 cm.  GALLBLADDER: Stones. Wall measuring 0.7 cm. No obvious pericholecystic   fluid. Negative sonographic Denney sign. Patient was premedicated.  PANCREAS: Poorly visualized due to bowel gas.  RIGHT KIDNEY: 11.0 cm. No hydronephrosis.  ADDITIONAL: No ascites.    IMPRESSION:    Cholelithiasis with gallbladder wall thickening.. Unreliable sonographic   Denney sign as the patient was premedicated. If there is clinical   suspicion for acute cholecystitis, hepatobiliary scan may be obtained for   further evaluation.    < from: CT Abdomen and Pelvis w/ Oral Cont and w/ IV Cont (23 @ 04:53) >  ACC: [id number] EXAM:  CT ABDOMEN AND PELVIS OC IC   ORDERED BY: ESTHER GARCIA     PROCEDURE DATE:  2023          INTERPRETATION:  CLINICAL INFORMATION: Right lower quadrant pain    COMPARISON: CT abdomen/pelvis 10/5/2023    CONTRAST/COMPLICATIONS:  IV Contrast: Omnipaque 350  75 cc administered   2 cc discarded  Oral Contrast: NONE  Complications: None reported at time of study completion    PROCEDURE:  CT of the Abdomen and Pelvis was performed.  Sagittal and coronal reformats were performed.    FINDINGS:  LOWER CHEST: Within normal limits.    LIVER: Within normal limits.  BILE DUCTS: Normal caliber.  GALLBLADDER: Diffuse gallbladder wall thickening/edema.  SPLEEN: Within normal limits.  PANCREAS: Within normal limits.  ADRENALS: Within normal limits.  KIDNEYS/URETERS: Within normal limits.    BLADDER: Within normal limits.  REPRODUCTIVE ORGANS: Uterus and adnexa within normal limits.    BOWEL: Small hiatal hernia. Status post sleeve gastrectomy. No bowel   obstruction. Appendix is normal.  PERITONEUM: No ascites.  VESSELS: Within normal limits.  RETROPERITONEUM/LYMPH NODES: No lymphadenopathy.  ABDOMINAL WALL: Within normal limits.  BONES: Within normal limits.    IMPRESSION:  Diffuse gallbladder wall thickening/edema. Recommendcorrelation with   right upper quadrant ultrasound.    Normal appendix.    < from: NM Hepatobiliary Imaging (23 @ 11:45) >  ACC: [id number] EXAM:  NM HEPATOBILIARY IMG   ORDERED BY: SCOTT ONEIL     PROCEDURE DATE:  2023          INTERPRETATION:  RADIOPHARMACEUTICAL:  99mTc-Mebrofenin  DOSE: 3 mCi IV    CLINICAL INFORMATION: 33 year old female with abdominal pain, referred to   evaluate for acute cholecystitis    TECHNIQUE: Dynamic images of the anterior abdomen were obtained for 1   hour immediately following radiotracer injection. Static images of the   abdomen in the anterior, right anterior oblique and right lateral   projections were also obtained.    COMPARISON: No previous hepatobiliary scan for comparison    FINDINGS: There is prompt uptake of the injected radiotracer by the   hepatocytes. The gallbladder is first visualized at 20 minutes post   tracer injection and bowel activity by 35 minutes. There is normal tracer   clearance from the liver by the end of the study.    IMPRESSION: Normal hepatobiliary scan.    No scan evidence of acute cholecystitis.    A/P  34yo F with PMH of gall stones, kidney stones, and PSH of gastric sleeve, , presents to ED c/o abdominal pain x1 day. Found to have gallstones with no signs of acute cholecystitis on HIDA scan.  PO challenge   Patient stable for discharge from surgical standpoint if tolerates diet  OP f/u with Dr. Rodríguez Sutherland for elective cholecystectomy if patient wants  D/w Dr. Sutherland

## 2023-12-28 NOTE — ED PROVIDER NOTE - OBJECTIVE STATEMENT
34 yo F with lower abd pain, constant vomiting for 2 hours.  Pt. denies trauma/inciting event.  She notes history of stones, but this is different.  lower abd pain radiates to back, no change in bowel/bladder habits.  EMS gave fent en route with some relief temporarily.  No other complaints or associated symptoms.   ROS: negative for fever, cough, headache, chest pain, shortness of breath, diarrhea, rash, paresthesia, and focal weakness--all other systems reviewed are negative.   PMH: gastric sleeve in past; Meds: birth control; SH: Denies smoking/drinking/drug ue

## 2023-12-28 NOTE — ED ADULT NURSE NOTE - OBJECTIVE STATEMENT
AAox3 BIBA,  pt c/o LLQ abdominal pain radiating to back  and N/V. Denies fever, chills. (PMH-kidney stones, Gastric-Sleeve 8/2023).

## 2023-12-28 NOTE — ED ADULT NURSE NOTE - NSFALLUNIVINTERV_ED_ALL_ED
Bed/Stretcher in lowest position, wheels locked, appropriate side rails in place/Call bell, personal items and telephone in reach/Instruct patient to call for assistance before getting out of bed/chair/stretcher/Non-slip footwear applied when patient is off stretcher/[place] to call system/Physically safe environment - no spills, clutter or unnecessary equipment/Purposeful proactive rounding/Room/bathroom lighting operational, light cord in reach Bed/Stretcher in lowest position, wheels locked, appropriate side rails in place/Call bell, personal items and telephone in reach/Instruct patient to call for assistance before getting out of bed/chair/stretcher/Non-slip footwear applied when patient is off stretcher/[place] to call system/Physically safe environment - no spills, clutter or unnecessary equipment/Purposeful proactive rounding/Room/bathroom lighting operational, light cord in reach

## 2023-12-28 NOTE — ED PROVIDER NOTE - NSFOLLOWUPINSTRUCTIONS_ED_ALL_ED_FT
Take ACETAMINOPHEN as needed for moderate pain.    Add TRAMADOL if the pain becomes more severe.      Take the ANTIBIOTICS completely as prescribed.

## 2023-12-28 NOTE — ED PROVIDER NOTE - CLINICAL SUMMARY MEDICAL DECISION MAKING FREE TEXT BOX
34 yo F with RLQ abd pain/tenderness, vomiting, most concerning for appy, less likely obstruction, uti, renal stone  -cbc ,cmp, type and screen, blood cx, ua/cx, hcg, lactate, lipase, coags, CT ab/pel, EKG, iv, hydration bolus, zosyn coverage for likely appy, tylenol/morphine for pain, pepcid/reglan for n/v, npo, monitor  -f/u results, reeval 32 yo F with RLQ abd pain/tenderness, vomiting, most concerning for appy, less likely obstruction, uti, renal stone  -cbc ,cmp, type and screen, blood cx, ua/cx, hcg, lactate, lipase, coags, CT ab/pel, EKG, iv, hydration bolus, zosyn coverage for likely appy, tylenol/morphine for pain, pepcid/reglan for n/v, npo, monitor  -f/u results, reeval

## 2023-12-28 NOTE — ED PROVIDER NOTE - PHYSICAL EXAMINATION
Vitals: WNL  Gen: AAOx3, sitting uncomfortably in stretcher, non-toxic   Head: ncat, perrla, eomi b/l  Neck: supple, no lymphadenopathy, no midline deviation  Heart: rrr, no m/r/g  Lungs: CTA b/l, no rales/ronchi/wheezes  Abd: soft, + tender in RLQ, non-distended, + rebound to RLQ, no guarding  Ext: no clubbing/cyanosis/edema  Neuro: sensation and muscle strength intact b/l

## 2023-12-28 NOTE — ED PROVIDER NOTE - PATIENT PORTAL LINK FT
You can access the FollowMyHealth Patient Portal offered by [facility] by registering at the following website: [email or web address]. By joining [employer]’s FollowMyHealth portal, you will also be able to view your health information using other applications (apps) compatible with our system. You can access the FollowMyHealth Patient Portal offered by [facility] by registering at the following website: [email or web address]. By joining [employer]’s FollowMyHealth portal, you will also be able to view your health information using other applications (apps) compatible with our system.

## 2023-12-29 LAB
CULTURE RESULTS: NO GROWTH — SIGNIFICANT CHANGE UP
CULTURE RESULTS: NO GROWTH — SIGNIFICANT CHANGE UP
SPECIMEN SOURCE: SIGNIFICANT CHANGE UP
SPECIMEN SOURCE: SIGNIFICANT CHANGE UP

## 2024-01-02 LAB
CULTURE RESULTS: SIGNIFICANT CHANGE UP
SPECIMEN SOURCE: SIGNIFICANT CHANGE UP

## 2024-02-06 ENCOUNTER — EMERGENCY (EMERGENCY)
Facility: HOSPITAL | Age: 34
LOS: 0 days | Discharge: HOME | End: 2024-02-06
Attending: STUDENT IN AN ORGANIZED HEALTH CARE EDUCATION/TRAINING PROGRAM
Payer: MEDICAID

## 2024-02-06 VITALS
OXYGEN SATURATION: 97 % | HEART RATE: 67 BPM | SYSTOLIC BLOOD PRESSURE: 110 MMHG | TEMPERATURE: 98 F | HEIGHT: 65 IN | RESPIRATION RATE: 16 BRPM | WEIGHT: 130.07 LBS | DIASTOLIC BLOOD PRESSURE: 74 MMHG

## 2024-02-06 VITALS
SYSTOLIC BLOOD PRESSURE: 119 MMHG | RESPIRATION RATE: 19 BRPM | OXYGEN SATURATION: 100 % | HEART RATE: 57 BPM | DIASTOLIC BLOOD PRESSURE: 69 MMHG | TEMPERATURE: 98 F

## 2024-02-06 DIAGNOSIS — Z90.3 ACQUIRED ABSENCE OF STOMACH [PART OF]: Chronic | ICD-10-CM

## 2024-02-06 DIAGNOSIS — R11.0 NAUSEA: ICD-10-CM

## 2024-02-06 DIAGNOSIS — Z87.442 PERSONAL HISTORY OF URINARY CALCULI: ICD-10-CM

## 2024-02-06 DIAGNOSIS — R10.9 UNSPECIFIED ABDOMINAL PAIN: ICD-10-CM

## 2024-02-06 DIAGNOSIS — Z87.19 PERSONAL HISTORY OF OTHER DISEASES OF THE DIGESTIVE SYSTEM: ICD-10-CM

## 2024-02-06 DIAGNOSIS — N39.0 URINARY TRACT INFECTION, SITE NOT SPECIFIED: ICD-10-CM

## 2024-02-06 DIAGNOSIS — N13.2 HYDRONEPHROSIS WITH RENAL AND URETERAL CALCULOUS OBSTRUCTION: ICD-10-CM

## 2024-02-06 PROBLEM — Z78.9 OTHER SPECIFIED HEALTH STATUS: Chronic | Status: ACTIVE | Noted: 2023-10-05

## 2024-02-06 LAB
ALBUMIN SERPL ELPH-MCNC: 3.5 G/DL — SIGNIFICANT CHANGE UP (ref 3.3–5)
ALP SERPL-CCNC: 53 U/L — SIGNIFICANT CHANGE UP (ref 40–120)
ALT FLD-CCNC: 97 U/L — HIGH (ref 12–78)
ANION GAP SERPL CALC-SCNC: 3 MMOL/L — LOW (ref 5–17)
APPEARANCE UR: ABNORMAL
APTT BLD: 29.1 SEC — SIGNIFICANT CHANGE UP (ref 24.5–35.6)
AST SERPL-CCNC: 43 U/L — HIGH (ref 15–37)
BACTERIA # UR AUTO: ABNORMAL /HPF
BASOPHILS # BLD AUTO: 0.05 K/UL — SIGNIFICANT CHANGE UP (ref 0–0.2)
BASOPHILS NFR BLD AUTO: 0.6 % — SIGNIFICANT CHANGE UP (ref 0–2)
BILIRUB SERPL-MCNC: 0.4 MG/DL — SIGNIFICANT CHANGE UP (ref 0.2–1.2)
BILIRUB UR-MCNC: NEGATIVE — SIGNIFICANT CHANGE UP
BUN SERPL-MCNC: 12 MG/DL — SIGNIFICANT CHANGE UP (ref 7–23)
CALCIUM SERPL-MCNC: 9.7 MG/DL — SIGNIFICANT CHANGE UP (ref 8.5–10.1)
CHLORIDE SERPL-SCNC: 109 MMOL/L — HIGH (ref 96–108)
CO2 SERPL-SCNC: 29 MMOL/L — SIGNIFICANT CHANGE UP (ref 22–31)
COLOR SPEC: YELLOW — SIGNIFICANT CHANGE UP
CREAT SERPL-MCNC: 0.76 MG/DL — SIGNIFICANT CHANGE UP (ref 0.5–1.3)
DIFF PNL FLD: ABNORMAL
EGFR: 107 ML/MIN/1.73M2 — SIGNIFICANT CHANGE UP
EOSINOPHIL # BLD AUTO: 0.2 K/UL — SIGNIFICANT CHANGE UP (ref 0–0.5)
EOSINOPHIL NFR BLD AUTO: 2.4 % — SIGNIFICANT CHANGE UP (ref 0–6)
EPI CELLS # UR: PRESENT
GLUCOSE SERPL-MCNC: 84 MG/DL — SIGNIFICANT CHANGE UP (ref 70–99)
GLUCOSE UR QL: NEGATIVE MG/DL — SIGNIFICANT CHANGE UP
HCG SERPL-ACNC: <1 MIU/ML — SIGNIFICANT CHANGE UP
HCT VFR BLD CALC: 39.4 % — SIGNIFICANT CHANGE UP (ref 34.5–45)
HGB BLD-MCNC: 13.4 G/DL — SIGNIFICANT CHANGE UP (ref 11.5–15.5)
IMM GRANULOCYTES NFR BLD AUTO: 0.2 % — SIGNIFICANT CHANGE UP (ref 0–0.9)
INR BLD: 0.93 RATIO — SIGNIFICANT CHANGE UP (ref 0.85–1.18)
KETONES UR-MCNC: ABNORMAL MG/DL
LEUKOCYTE ESTERASE UR-ACNC: ABNORMAL
LYMPHOCYTES # BLD AUTO: 2.09 K/UL — SIGNIFICANT CHANGE UP (ref 1–3.3)
LYMPHOCYTES # BLD AUTO: 25.3 % — SIGNIFICANT CHANGE UP (ref 13–44)
MCHC RBC-ENTMCNC: 31.5 PG — SIGNIFICANT CHANGE UP (ref 27–34)
MCHC RBC-ENTMCNC: 34 G/DL — SIGNIFICANT CHANGE UP (ref 32–36)
MCV RBC AUTO: 92.7 FL — SIGNIFICANT CHANGE UP (ref 80–100)
MONOCYTES # BLD AUTO: 0.44 K/UL — SIGNIFICANT CHANGE UP (ref 0–0.9)
MONOCYTES NFR BLD AUTO: 5.3 % — SIGNIFICANT CHANGE UP (ref 2–14)
NEUTROPHILS # BLD AUTO: 5.45 K/UL — SIGNIFICANT CHANGE UP (ref 1.8–7.4)
NEUTROPHILS NFR BLD AUTO: 66.2 % — SIGNIFICANT CHANGE UP (ref 43–77)
NITRITE UR-MCNC: POSITIVE
NRBC # BLD: 0 /100 WBCS — SIGNIFICANT CHANGE UP (ref 0–0)
PH UR: 5.5 — SIGNIFICANT CHANGE UP (ref 5–8)
PLATELET # BLD AUTO: 258 K/UL — SIGNIFICANT CHANGE UP (ref 150–400)
POTASSIUM SERPL-MCNC: 4.7 MMOL/L — SIGNIFICANT CHANGE UP (ref 3.5–5.3)
POTASSIUM SERPL-SCNC: 4.7 MMOL/L — SIGNIFICANT CHANGE UP (ref 3.5–5.3)
PROT SERPL-MCNC: 7.4 GM/DL — SIGNIFICANT CHANGE UP (ref 6–8.3)
PROT UR-MCNC: SIGNIFICANT CHANGE UP MG/DL
PROTHROM AB SERPL-ACNC: 11.1 SEC — SIGNIFICANT CHANGE UP (ref 9.5–13)
RBC # BLD: 4.25 M/UL — SIGNIFICANT CHANGE UP (ref 3.8–5.2)
RBC # FLD: 12.4 % — SIGNIFICANT CHANGE UP (ref 10.3–14.5)
RBC CASTS # UR COMP ASSIST: SIGNIFICANT CHANGE UP /HPF (ref 0–4)
SODIUM SERPL-SCNC: 141 MMOL/L — SIGNIFICANT CHANGE UP (ref 135–145)
SP GR SPEC: 1.02 — SIGNIFICANT CHANGE UP (ref 1–1.03)
TROPONIN I, HIGH SENSITIVITY RESULT: <3 NG/L — SIGNIFICANT CHANGE UP
UROBILINOGEN FLD QL: 0.2 MG/DL — SIGNIFICANT CHANGE UP (ref 0.2–1)
WBC # BLD: 8.25 K/UL — SIGNIFICANT CHANGE UP (ref 3.8–10.5)
WBC # FLD AUTO: 8.25 K/UL — SIGNIFICANT CHANGE UP (ref 3.8–10.5)
WBC UR QL: >50 /HPF — SIGNIFICANT CHANGE UP (ref 0–5)

## 2024-02-06 PROCEDURE — G1004: CPT

## 2024-02-06 PROCEDURE — 99285 EMERGENCY DEPT VISIT HI MDM: CPT

## 2024-02-06 PROCEDURE — 99284 EMERGENCY DEPT VISIT MOD MDM: CPT

## 2024-02-06 PROCEDURE — 74176 CT ABD & PELVIS W/O CONTRAST: CPT | Mod: 26,ME

## 2024-02-06 RX ORDER — KETOROLAC TROMETHAMINE 30 MG/ML
15 SYRINGE (ML) INJECTION ONCE
Refills: 0 | Status: DISCONTINUED | OUTPATIENT
Start: 2024-02-06 | End: 2024-02-06

## 2024-02-06 RX ORDER — IBUPROFEN 200 MG
1 TABLET ORAL
Qty: 42 | Refills: 0
Start: 2024-02-06 | End: 2024-02-19

## 2024-02-06 RX ORDER — TAMSULOSIN HYDROCHLORIDE 0.4 MG/1
0.4 CAPSULE ORAL ONCE
Refills: 0 | Status: COMPLETED | OUTPATIENT
Start: 2024-02-06 | End: 2024-02-06

## 2024-02-06 RX ORDER — DOCUSATE SODIUM 100 MG
1 CAPSULE ORAL
Qty: 0 | Refills: 0 | DISCHARGE

## 2024-02-06 RX ORDER — MORPHINE SULFATE 50 MG/1
2 CAPSULE, EXTENDED RELEASE ORAL ONCE
Refills: 0 | Status: DISCONTINUED | OUTPATIENT
Start: 2024-02-06 | End: 2024-02-06

## 2024-02-06 RX ORDER — TAMSULOSIN HYDROCHLORIDE 0.4 MG/1
1 CAPSULE ORAL
Qty: 14 | Refills: 0
Start: 2024-02-06 | End: 2024-02-19

## 2024-02-06 RX ORDER — PANTOPRAZOLE SODIUM 20 MG/1
1 TABLET, DELAYED RELEASE ORAL
Refills: 0 | DISCHARGE

## 2024-02-06 RX ORDER — SODIUM CHLORIDE 9 MG/ML
1000 INJECTION INTRAMUSCULAR; INTRAVENOUS; SUBCUTANEOUS ONCE
Refills: 0 | Status: COMPLETED | OUTPATIENT
Start: 2024-02-06 | End: 2024-02-06

## 2024-02-06 RX ORDER — CEFPODOXIME PROXETIL 100 MG
1 TABLET ORAL
Qty: 20 | Refills: 0
Start: 2024-02-06 | End: 2024-02-15

## 2024-02-06 RX ORDER — CEFTRIAXONE 500 MG/1
1000 INJECTION, POWDER, FOR SOLUTION INTRAMUSCULAR; INTRAVENOUS ONCE
Refills: 0 | Status: COMPLETED | OUTPATIENT
Start: 2024-02-06 | End: 2024-02-06

## 2024-02-06 RX ADMIN — SODIUM CHLORIDE 1000 MILLILITER(S): 9 INJECTION INTRAMUSCULAR; INTRAVENOUS; SUBCUTANEOUS at 23:04

## 2024-02-06 RX ADMIN — Medication 15 MILLIGRAM(S): at 19:46

## 2024-02-06 RX ADMIN — TAMSULOSIN HYDROCHLORIDE 0.4 MILLIGRAM(S): 0.4 CAPSULE ORAL at 22:43

## 2024-02-06 RX ADMIN — Medication 15 MILLIGRAM(S): at 19:31

## 2024-02-06 RX ADMIN — CEFTRIAXONE 100 MILLIGRAM(S): 500 INJECTION, POWDER, FOR SOLUTION INTRAMUSCULAR; INTRAVENOUS at 20:49

## 2024-02-06 RX ADMIN — MORPHINE SULFATE 2 MILLIGRAM(S): 50 CAPSULE, EXTENDED RELEASE ORAL at 22:43

## 2024-02-06 RX ADMIN — SODIUM CHLORIDE 1000 MILLILITER(S): 9 INJECTION INTRAMUSCULAR; INTRAVENOUS; SUBCUTANEOUS at 19:43

## 2024-02-06 NOTE — CONSULT NOTE ADULT - PROBLEM SELECTOR RECOMMENDATION 9
Continue antibiotics and Flomax, another liter bolus, strain urine  Follow up in the office  Discussed with Dr. Turner

## 2024-02-06 NOTE — CONSULT NOTE ADULT - ASSESSMENT
34 y/o female PMHx gallstones, nephrolithiasis hospitalized at [facility] in October for MET, sleeve gastrectomy 2023,   presents c/o right flank pain since last night. CT shows Mild right hydroureteronephrosis to the level of a 2 mm right distal ureteral calculus. 32 y/o female PMHx gallstones, nephrolithiasis hospitalized at [facility] in October for MET, sleeve gastrectomy 2023,   presents c/o right flank pain since last night. CT shows Mild right hydroureteronephrosis to the level of a 2 mm right distal ureteral calculus. Afebrile. WBC WNL. +UTI.

## 2024-02-06 NOTE — ED PROVIDER NOTE - OBJECTIVE STATEMENT
34 y/o F hx of kidney stones, gastric sleeve presents w/ R flank pain for past 1 day. endorsing similar pain to prior kidney stones. endorsing urge to urinate but unable to empty completely as well. denies nausea/vomiting. denies fever/chills. denies trauma/falls. denies chest pain/sob.

## 2024-02-06 NOTE — ED ADULT TRIAGE NOTE - CHIEF COMPLAINT QUOTE
Patient c/o sharp intermittent pain to right flank since yesterday. Patient states feeling pressure and urgency. LMP 1/30/24  hx gastric sleeve 8/2023

## 2024-02-06 NOTE — ED ADULT NURSE NOTE - OBJECTIVE STATEMENT
patient alert and oriented x4. patient 34 yo female w/ no PMH c/o right flank pain. patient states she has had history of kidney stone sin the past. describes pain as "sharp" rated 9/10. reports urinary urgency, nausea, and decreased urine output. denies hematuria, vomiting, fever.

## 2024-02-06 NOTE — CONSULT NOTE ADULT - SUBJECTIVE AND OBJECTIVE BOX
UROLOGY CONSULT NOTE    Patient is a 33 year old female who presents with a chief complaint of right flank pain.     HPI: 34 y/o female PMHx gallstones, nephrolithiasis hospitalized at [facility] in October for MET, sleeve gastrectomy 2023,   presents c/o right flank pain since last night. States that it feels like she's not emptying her bladder completely when she voids. Had mild nausea earlier but denies vomiting. Patient denies fever, chills, constipation, diarrhea, melena, hematochezia, dysuria, hematuria, chest pain, shortness of breath, dizziness, cough.     REVIEW OF SYSTEMS:  CONSTITUTIONAL: No fever, weight loss, or fatigue  EYES: No eye pain, visual disturbances, discharge  ENMT:  No difficulty hearing, tinnitus, vertigo; No sinus or throat pain  NECK: No pain or stiffness  BREASTS: No pain, masses, or nipple discharge  RESPIRATORY: No cough, wheezing, chills or hemoptysis; No shortness of breath  CARDIOVASCULAR: No chest pain, palpitations, dizziness, or leg swelling  GASTROINTESTINAL: No abdominal or epigastric pain. No nausea, vomiting, or hematemesis; No diarrhea or constipation. No melena or hematochezia.  GENITOURINARY: +right flank pain. + feels like she's not emptying her bladder completely when she voids. No dysuria, frequency, hematuria, or incontinence  NEUROLOGICAL: No headaches, memory loss, loss of strength, numbness, or tremors  SKIN: No itching, burning, rashes, or lesions   LYMPH NODES: No enlarged glands  ENDOCRINE: No heat or cold intolerance; No hair loss  MUSCULOSKELETAL: No joint pain or swelling; No muscle, back, or extremity pain  PSYCHIATRIC: No depression, anxiety, mood swings, or difficulty sleeping  HEME/LYMPH: No easy bruising, or bleeding gums  ALLERY AND IMMUNOLOGIC: No hives or eczema     PAST MEDICAL & SURGICAL HISTORY:  gallstones, nephrolithiasis hospitalized at [facility] in October for MET, sleeve gastrectomy 2023,      MEDICATIONS: See outpatient home medications.     Allergies  No Known Allergies    SOCIAL HISTORY: Denies tobacco, alcohol, illicit drug use.           FAMILY HISTORY: No known family hx.     Vital Signs Last 24 Hrs  T(C): 36.5 (2024 23:49), Max: 37.1 (2024 19:37)  T(F): 97.7 (2024 23:49), Max: 98.8 (2024 19:37)  HR: 57 (2024 23:49) (57 - 71)  BP: 119/69 (2024 23:49) (100/67 - 119/69)  BP(mean): --  RR: 19 (2024 23:49) (16 - 19)  SpO2: 100% (2024 23:49) (97% - 100%)    Parameters below as of 2024 23:49  Patient On (Oxygen Delivery Method): room air    PHYSICAL EXAM:  CONSTITUTIONAL: NAD, well-developed  HEAD:  Atraumatic, Normocephalic  EYES: Conjunctiva and sclera clear  ENMT: No tonsillar erythema, exudates, or enlargement; Moist mucous membranes, No lesions  NECK: Supple, No JVD, Normal thyroid  NERVOUS SYSTEM:  Alert & Oriented X3  RESPIRATORY: Clear to auscultation bilaterally; No rales, rhonchi, wheezing  CARDIOVASCULAR: Regular rate and rhythm. S1S2  GASTROINTESTINAL: Nondistended, +BS, soft, nontender, no guarding, no rigidity   : No suprapubic or CVA tenderness. No bladder distention.   MUSCULOSKELETAL: 2+ Peripheral Pulses, No clubbing, cyanosis, or edema     LABS:                        13.4   8.25  )-----------( 258      ( 2024 19:42 )             39.4     02-06    141  |  109<H>  |  12  ----------------------------<  84  4.7   |  29  |  0.76    Ca    9.7      2024 19:42    TPro  7.4  /  Alb  3.5  /  TBili  0.4  /  DBili  x   /  AST  43<H>  /  ALT  97<H>  /  AlkPhos  53  02-06    PT/INR - ( 2024 19:42 )   PT: 11.1 sec;   INR: 0.93 ratio      PTT - ( 2024 19:42 )  PTT:29.1 sec    Urinalysis Basic - ( 2024 19:42 )    Color: Yellow / Appearance: Cloudy / S.022 / pH: x  Gluc: 84 mg/dL / Ketone: Trace mg/dL  / Bili: Negative / Urobili: 0.2 mg/dL   Blood: x / Protein: Trace mg/dL / Nitrite: Positive   Leuk Esterase: Moderate / RBC: 6-8 /HPF / WBC >50 /HPF   Sq Epi: x / Non Sq Epi: x / Bacteria: Moderate /HPF      < from: CT Renal Stone Hunt (24 @ 21:01) >    FINDINGS:  LOWER CHEST: Within normal limits.    LIVER: Within normal limits.  BILE DUCTS: Normal caliber.  GALLBLADDER: Within normal limits.  SPLEEN: Within normal limits.  PANCREAS: Within normal limits.  ADRENALS: Within normal limits.  KIDNEYS/URETERS: Punctate nonobstructing left upper pole calculus. No   left hydronephrosis.  Mild right hydroureteronephrosis to the level of a 2 mm right distal   ureteral calculus (2:111)..  Hyperdense medullary. Minutes.    BLADDER: Within normal limits.  REPRODUCTIVE ORGANS: Prominent uterus.    BOWEL: No bowel obstruction. Appendix is normal.. Sleeve gastrectomy.   Small sliding hiatus hernia.  PERITONEUM: No ascites.  VESSELS: Within normal limits.  RETROPERITONEUM/LYMPH NODES: No lymphadenopathy.  ABDOMINAL WALL: Within normal limits.  BONES: Within normal limits.    IMPRESSION:    Mild right hydroureteronephrosis to the level of a 2 mm right distal   ureteral calculus. Punctate nonobstructing left renal calculus.    Hyperdense medullary pyramids may indicate concentrated urine or   medullary calcinosis.    Sleeve gastrectomy.    < end of copied text >

## 2024-02-06 NOTE — ED PROVIDER NOTE - NSFOLLOWUPINSTRUCTIONS_ED_ALL_ED_FT
take antibiotics twice per day for next 10 days.   Return to ER if symptoms worsen.   followup with urologist in next 1-10 days.  Take ibuprofen every 8 hours for pain as needed.   Take flomax at night before bedtime once per day.    Urinary Tract Infection    A urinary tract infection (UTI) is an infection of any part of the urinary tract, which includes the kidneys, ureters, bladder, and urethra. Risk factors include ignoring your need to urinate, wiping back to front if female, being an uncircumcised male, and having diabetes or a weak immune system. Symptoms include frequent urination, pain or burning with urination, foul smelling urine, cloudy urine, pain in the lower abdomen, blood in the urine, and fever. If you were prescribed an antibiotic medicine, take it as told by your health care provider. Do not stop taking the antibiotic even if you start to feel better.    SEEK IMMEDIATE MEDICAL CARE IF YOU HAVE ANY OF THE FOLLOWING SYMPTOMS: severe back or abdominal pain, fever, inability to keep fluids or medicine down, dizziness/lightheadedness, or a change in mental status.      Kidney Stones    WHAT YOU NEED TO KNOW:    Kidney stones form in the urinary system when the water and waste in your urine are out of balance. When this happens, certain types of waste crystals separate from the urine. The crystals build up and form kidney stones. You may have more than one kidney stone.    Kidney Stones          DISCHARGE INSTRUCTIONS:    Seek care immediately if:   •You are vomiting and it is not relieved with medicine.          Call your doctor or kidney specialist if:   •You have a fever.      •You have trouble urinating.      •You see blood in your urine.      •You have severe pain.      •You have any questions or concerns about your condition or care.      Medicines: You may need any of the following:  •NSAIDs, such as ibuprofen, help decrease swelling, pain, and fever. This medicine is available with or without a doctor's order. NSAIDs can cause stomach bleeding or kidney problems in certain people. If you take blood thinner medicine, always ask your healthcare provider if NSAIDs are safe for you. Always read the medicine label and follow directions.      •Acetaminophen decreases pain and fever. It is available without a doctor's order. Ask how much to take and how often to take it. Follow directions. Read the labels of all other medicines you are using to see if they also contain acetaminophen, or ask your doctor or pharmacist. Acetaminophen can cause liver damage if not taken correctly. Do not use more than 4 grams (4,000 milligrams) total of acetaminophen in one day.       •Prescription pain medicine may be given. Ask your healthcare provider how to take this medicine safely. Some prescription pain medicines contain acetaminophen. Do not take other medicines that contain acetaminophen without talking to your healthcare provider. Too much acetaminophen may cause liver damage. Prescription pain medicine may cause constipation. Ask your healthcare provider how to prevent or treat constipation.       •Medicines to balance your electrolytes may be needed.      •Take your medicine as directed. Contact your healthcare provider if you think your medicine is not helping or if you have side effects. Tell him or her if you are allergic to any medicine. Keep a list of the medicines, vitamins, and herbs you take. Include the amounts, and when and why you take them. Bring the list or the pill bottles to follow-up visits. Carry your medicine list with you in case of an emergency.      What you can do to manage kidney stones:   •Drink more liquids. Your healthcare provider may tell you to drink at least 8 to 12 (eight-ounce) cups of liquids each day. This helps flush out the kidney stones when you urinate. Water is the best liquid to drink.      •Strain your urine every time you go to the bathroom. Urinate through a strainer or a piece of thin cloth to catch the stones. Take the stones to your healthcare provider so they can be sent to the lab for tests. This will help your healthcare providers plan the best treatment for you.  Look for Stones in the Filter           •Eat a variety of healthy foods. Healthy foods include fruits, vegetables, whole-grain breads, low-fat dairy products, beans, and fish. You may need to limit how much sodium (salt) or protein you eat. Ask for information about the best foods for you.  Healthy Foods           •Be physically active as directed. Your stones may pass more easily if you stay active. Physical activity can also help you manage your weight. Ask about the best activities for you.    After you pass the kidney stones: Your healthcare provider may order a 24-hour urine test. Results from a 24-hour urine test will help your healthcare provider plan ways to prevent more stones from forming. Your healthcare provider will give you more instructions.    Follow up with your doctor or kidney specialist as directed: Write down your questions so you remember to ask them during your visits.

## 2024-02-06 NOTE — ED PROVIDER NOTE - PATIENT PORTAL LINK FT
You can access the FollowMyHealth Patient Portal offered by [facility] by registering at the following website: [email or web address]. By joining [employer]’s FollowMyHealth portal, you will also be able to view your health information using other applications (apps) compatible with our system.

## 2024-02-06 NOTE — ED PROVIDER NOTE - PHYSICAL EXAMINATION
General: Well appearing female in no acute distress  HEENT: Normocephalic, atraumatic. Moist mucous membranes. Oropharynx clear. No lymphadenopathy.  Eyes: No scleral icterus. EOMI. RONAL.  Neck:. Soft and supple. Full ROM without pain. No midline tenderness  Cardiac: Regular rate and regular rhythm. No murmurs, rubs, gallops. Peripheral pulses 2+ and symmetric. No LE edema.  Resp: Lungs CTAB. Speaking in full sentences. No wheezes, rales or rhonchi.  Abd: Soft, non-tender, non-distended. No guarding or rebound. No scars, masses, or lesions.  Back: Spine midline and non-tender. +mild R CVA tenderness.    Skin: No rashes, abrasions, or lacerations.  Neuro: AO x 3. Moves all extremities symmetrically. Motor strength and sensation grossly intact.

## 2024-02-06 NOTE — ED PROVIDER NOTE - CLINICAL SUMMARY MEDICAL DECISION MAKING FREE TEXT BOX
32 y/o F hx of kidney stones, gastric sleeve presents w/ R flank pain for past 1 day. endorsing similar pain to prior kidney stones. endorsing urge to urinate but unable to empty completely as well. denies nausea/vomiting. denies fever/chills. denies trauma/falls. denies chest pain/sob.   mild R CVA tenderness, benign abdomen.   labs, pain control.   CT renal stone hunt  r/o pyelo/uti , check ua/uc. 32 y/o F hx of kidney stones, gastric sleeve presents w/ R flank pain for past 1 day. endorsing similar pain to prior kidney stones. endorsing urge to urinate but unable to empty completely as well. denies nausea/vomiting. denies fever/chills. denies trauma/falls. denies chest pain/sob.   mild R CVA tenderness, benign abdomen.   labs, pain control.   CT renal stone hunt  r/o pyelo/uti , check ua/uc.    pain controlled, will dc w/ abx, f/u with urology outpatient. urology was consulted, no acute intervention here recommended.

## 2024-02-06 NOTE — ED PROVIDER NOTE - CARE PROVIDER_API CALL
Destiny Turner  Urology  [address] [place] [address]  Phone: [phone]  Fax: [phone]  Follow Up Time: 7-10 Days

## 2024-02-06 NOTE — ED ADULT NURSE NOTE - NSFALLUNIVINTERV_ED_ALL_ED
Bed/Stretcher in lowest position, wheels locked, appropriate side rails in place/Call bell, personal items and telephone in reach/Instruct patient to call for assistance before getting out of bed/chair/stretcher/Non-slip footwear applied when patient is off stretcher/[place] to call system/Physically safe environment - no spills, clutter or unnecessary equipment/Purposeful proactive rounding/Room/bathroom lighting operational, light cord in reach

## 2024-02-07 ENCOUNTER — INPATIENT (INPATIENT)
Facility: HOSPITAL | Age: 34
LOS: 1 days | Discharge: HOME | End: 2024-02-09
Attending: HOSPITALIST | Admitting: HOSPITALIST
Payer: MEDICAID

## 2024-02-07 VITALS
DIASTOLIC BLOOD PRESSURE: 75 MMHG | WEIGHT: 130.07 LBS | OXYGEN SATURATION: 99 % | HEART RATE: 74 BPM | HEIGHT: 65 IN | RESPIRATION RATE: 20 BRPM | SYSTOLIC BLOOD PRESSURE: 128 MMHG | TEMPERATURE: 99 F

## 2024-02-07 DIAGNOSIS — N20.0 CALCULUS OF KIDNEY: ICD-10-CM

## 2024-02-07 DIAGNOSIS — N20.1 CALCULUS OF URETER: ICD-10-CM

## 2024-02-07 DIAGNOSIS — R52 PAIN, UNSPECIFIED: ICD-10-CM

## 2024-02-07 DIAGNOSIS — N39.0 URINARY TRACT INFECTION, SITE NOT SPECIFIED: ICD-10-CM

## 2024-02-07 DIAGNOSIS — N13.30 UNSPECIFIED HYDRONEPHROSIS: ICD-10-CM

## 2024-02-07 DIAGNOSIS — Z90.3 ACQUIRED ABSENCE OF STOMACH [PART OF]: Chronic | ICD-10-CM

## 2024-02-07 LAB
ALBUMIN SERPL ELPH-MCNC: 3 G/DL — LOW (ref 3.3–5)
ALP SERPL-CCNC: 45 U/L — SIGNIFICANT CHANGE UP (ref 40–120)
ALT FLD-CCNC: 79 U/L — HIGH (ref 12–78)
ANION GAP SERPL CALC-SCNC: 6 MMOL/L — SIGNIFICANT CHANGE UP (ref 5–17)
AST SERPL-CCNC: 38 U/L — HIGH (ref 15–37)
BASOPHILS # BLD AUTO: 0.03 K/UL — SIGNIFICANT CHANGE UP (ref 0–0.2)
BASOPHILS NFR BLD AUTO: 0.2 % — SIGNIFICANT CHANGE UP (ref 0–2)
BILIRUB SERPL-MCNC: 0.6 MG/DL — SIGNIFICANT CHANGE UP (ref 0.2–1.2)
BUN SERPL-MCNC: 13 MG/DL — SIGNIFICANT CHANGE UP (ref 7–23)
CALCIUM SERPL-MCNC: 8.7 MG/DL — SIGNIFICANT CHANGE UP (ref 8.5–10.1)
CHLORIDE SERPL-SCNC: 110 MMOL/L — HIGH (ref 96–108)
CO2 SERPL-SCNC: 25 MMOL/L — SIGNIFICANT CHANGE UP (ref 22–31)
CREAT SERPL-MCNC: 0.84 MG/DL — SIGNIFICANT CHANGE UP (ref 0.5–1.3)
EGFR: 95 ML/MIN/1.73M2 — SIGNIFICANT CHANGE UP
EOSINOPHIL # BLD AUTO: 0 K/UL — SIGNIFICANT CHANGE UP (ref 0–0.5)
EOSINOPHIL NFR BLD AUTO: 0 % — SIGNIFICANT CHANGE UP (ref 0–6)
GLUCOSE SERPL-MCNC: 115 MG/DL — HIGH (ref 70–99)
HCT VFR BLD CALC: 37.3 % — SIGNIFICANT CHANGE UP (ref 34.5–45)
HGB BLD-MCNC: 12.3 G/DL — SIGNIFICANT CHANGE UP (ref 11.5–15.5)
IMM GRANULOCYTES NFR BLD AUTO: 0.5 % — SIGNIFICANT CHANGE UP (ref 0–0.9)
LYMPHOCYTES # BLD AUTO: 0.31 K/UL — LOW (ref 1–3.3)
LYMPHOCYTES # BLD AUTO: 2.1 % — LOW (ref 13–44)
MCHC RBC-ENTMCNC: 31.1 PG — SIGNIFICANT CHANGE UP (ref 27–34)
MCHC RBC-ENTMCNC: 33 G/DL — SIGNIFICANT CHANGE UP (ref 32–36)
MCV RBC AUTO: 94.2 FL — SIGNIFICANT CHANGE UP (ref 80–100)
MONOCYTES # BLD AUTO: 0.89 K/UL — SIGNIFICANT CHANGE UP (ref 0–0.9)
MONOCYTES NFR BLD AUTO: 6 % — SIGNIFICANT CHANGE UP (ref 2–14)
NEUTROPHILS # BLD AUTO: 13.49 K/UL — HIGH (ref 1.8–7.4)
NEUTROPHILS NFR BLD AUTO: 91.2 % — HIGH (ref 43–77)
NRBC # BLD: 0 /100 WBCS — SIGNIFICANT CHANGE UP (ref 0–0)
PLATELET # BLD AUTO: 223 K/UL — SIGNIFICANT CHANGE UP (ref 150–400)
POTASSIUM SERPL-MCNC: 4.5 MMOL/L — SIGNIFICANT CHANGE UP (ref 3.5–5.3)
POTASSIUM SERPL-SCNC: 4.5 MMOL/L — SIGNIFICANT CHANGE UP (ref 3.5–5.3)
PROT SERPL-MCNC: 6.8 GM/DL — SIGNIFICANT CHANGE UP (ref 6–8.3)
RBC # BLD: 3.96 M/UL — SIGNIFICANT CHANGE UP (ref 3.8–5.2)
RBC # FLD: 12.3 % — SIGNIFICANT CHANGE UP (ref 10.3–14.5)
SODIUM SERPL-SCNC: 141 MMOL/L — SIGNIFICANT CHANGE UP (ref 135–145)
WBC # BLD: 14.79 K/UL — HIGH (ref 3.8–10.5)
WBC # FLD AUTO: 14.79 K/UL — HIGH (ref 3.8–10.5)

## 2024-02-07 PROCEDURE — 99222 1ST HOSP IP/OBS MODERATE 55: CPT

## 2024-02-07 PROCEDURE — 99285 EMERGENCY DEPT VISIT HI MDM: CPT | Mod: 25

## 2024-02-07 RX ORDER — ACETAMINOPHEN 500 MG
650 TABLET ORAL EVERY 6 HOURS
Refills: 0 | Status: DISCONTINUED | OUTPATIENT
Start: 2024-02-07 | End: 2024-02-09

## 2024-02-07 RX ORDER — SODIUM CHLORIDE 9 MG/ML
1000 INJECTION, SOLUTION INTRAVENOUS
Refills: 0 | Status: DISCONTINUED | OUTPATIENT
Start: 2024-02-07 | End: 2024-02-09

## 2024-02-07 RX ORDER — OXYCODONE HYDROCHLORIDE 5 MG/1
5 TABLET ORAL EVERY 6 HOURS
Refills: 0 | Status: DISCONTINUED | OUTPATIENT
Start: 2024-02-07 | End: 2024-02-08

## 2024-02-07 RX ORDER — ONDANSETRON 8 MG/1
4 TABLET, FILM COATED ORAL EVERY 6 HOURS
Refills: 0 | Status: DISCONTINUED | OUTPATIENT
Start: 2024-02-07 | End: 2024-02-09

## 2024-02-07 RX ORDER — SODIUM CHLORIDE 9 MG/ML
2000 INJECTION, SOLUTION INTRAVENOUS ONCE
Refills: 0 | Status: COMPLETED | OUTPATIENT
Start: 2024-02-07 | End: 2024-02-07

## 2024-02-07 RX ORDER — SODIUM CHLORIDE 9 MG/ML
1000 INJECTION INTRAMUSCULAR; INTRAVENOUS; SUBCUTANEOUS ONCE
Refills: 0 | Status: COMPLETED | OUTPATIENT
Start: 2024-02-07 | End: 2024-02-07

## 2024-02-07 RX ORDER — CEFTRIAXONE 500 MG/1
1000 INJECTION, POWDER, FOR SOLUTION INTRAMUSCULAR; INTRAVENOUS EVERY 24 HOURS
Refills: 0 | Status: DISCONTINUED | OUTPATIENT
Start: 2024-02-08 | End: 2024-02-09

## 2024-02-07 RX ORDER — KETOROLAC TROMETHAMINE 30 MG/ML
15 SYRINGE (ML) INJECTION ONCE
Refills: 0 | Status: DISCONTINUED | OUTPATIENT
Start: 2024-02-07 | End: 2024-02-07

## 2024-02-07 RX ORDER — INFLUENZA VIRUS VACCINE 15; 15; 15; 15 UG/.5ML; UG/.5ML; UG/.5ML; UG/.5ML
0.5 SUSPENSION INTRAMUSCULAR ONCE
Refills: 0 | Status: DISCONTINUED | OUTPATIENT
Start: 2024-02-07 | End: 2024-02-09

## 2024-02-07 RX ORDER — MORPHINE SULFATE 50 MG/1
4 CAPSULE, EXTENDED RELEASE ORAL ONCE
Refills: 0 | Status: DISCONTINUED | OUTPATIENT
Start: 2024-02-07 | End: 2024-02-07

## 2024-02-07 RX ORDER — PANTOPRAZOLE SODIUM 20 MG/1
40 TABLET, DELAYED RELEASE ORAL
Refills: 0 | Status: DISCONTINUED | OUTPATIENT
Start: 2024-02-07 | End: 2024-02-09

## 2024-02-07 RX ORDER — ONDANSETRON 8 MG/1
4 TABLET, FILM COATED ORAL ONCE
Refills: 0 | Status: COMPLETED | OUTPATIENT
Start: 2024-02-07 | End: 2024-02-07

## 2024-02-07 RX ORDER — SENNA PLUS 8.6 MG/1
2 TABLET ORAL AT BEDTIME
Refills: 0 | Status: DISCONTINUED | OUTPATIENT
Start: 2024-02-07 | End: 2024-02-09

## 2024-02-07 RX ORDER — HYDROMORPHONE HYDROCHLORIDE 2 MG/ML
0.5 INJECTION INTRAMUSCULAR; INTRAVENOUS; SUBCUTANEOUS EVERY 4 HOURS
Refills: 0 | Status: DISCONTINUED | OUTPATIENT
Start: 2024-02-07 | End: 2024-02-08

## 2024-02-07 RX ORDER — TAMSULOSIN HYDROCHLORIDE 0.4 MG/1
0.8 CAPSULE ORAL AT BEDTIME
Refills: 0 | Status: DISCONTINUED | OUTPATIENT
Start: 2024-02-07 | End: 2024-02-09

## 2024-02-07 RX ADMIN — HYDROMORPHONE HYDROCHLORIDE 0.5 MILLIGRAM(S): 2 INJECTION INTRAMUSCULAR; INTRAVENOUS; SUBCUTANEOUS at 21:11

## 2024-02-07 RX ADMIN — MORPHINE SULFATE 4 MILLIGRAM(S): 50 CAPSULE, EXTENDED RELEASE ORAL at 10:15

## 2024-02-07 RX ADMIN — SODIUM CHLORIDE 1000 MILLILITER(S): 9 INJECTION, SOLUTION INTRAVENOUS at 12:47

## 2024-02-07 RX ADMIN — SODIUM CHLORIDE 1000 MILLILITER(S): 9 INJECTION INTRAMUSCULAR; INTRAVENOUS; SUBCUTANEOUS at 10:14

## 2024-02-07 RX ADMIN — PANTOPRAZOLE SODIUM 40 MILLIGRAM(S): 20 TABLET, DELAYED RELEASE ORAL at 12:47

## 2024-02-07 RX ADMIN — HYDROMORPHONE HYDROCHLORIDE 0.5 MILLIGRAM(S): 2 INJECTION INTRAMUSCULAR; INTRAVENOUS; SUBCUTANEOUS at 16:07

## 2024-02-07 RX ADMIN — SENNA PLUS 2 TABLET(S): 8.6 TABLET ORAL at 21:47

## 2024-02-07 RX ADMIN — SODIUM CHLORIDE 150 MILLILITER(S): 9 INJECTION, SOLUTION INTRAVENOUS at 21:13

## 2024-02-07 RX ADMIN — OXYCODONE HYDROCHLORIDE 5 MILLIGRAM(S): 5 TABLET ORAL at 19:39

## 2024-02-07 RX ADMIN — OXYCODONE HYDROCHLORIDE 5 MILLIGRAM(S): 5 TABLET ORAL at 20:39

## 2024-02-07 RX ADMIN — MORPHINE SULFATE 4 MILLIGRAM(S): 50 CAPSULE, EXTENDED RELEASE ORAL at 11:15

## 2024-02-07 RX ADMIN — Medication 15 MILLIGRAM(S): at 05:43

## 2024-02-07 RX ADMIN — HYDROMORPHONE HYDROCHLORIDE 0.5 MILLIGRAM(S): 2 INJECTION INTRAMUSCULAR; INTRAVENOUS; SUBCUTANEOUS at 15:07

## 2024-02-07 RX ADMIN — Medication 15 MILLIGRAM(S): at 11:15

## 2024-02-07 RX ADMIN — ONDANSETRON 4 MILLIGRAM(S): 8 TABLET, FILM COATED ORAL at 20:35

## 2024-02-07 RX ADMIN — ONDANSETRON 4 MILLIGRAM(S): 8 TABLET, FILM COATED ORAL at 15:06

## 2024-02-07 RX ADMIN — MORPHINE SULFATE 4 MILLIGRAM(S): 50 CAPSULE, EXTENDED RELEASE ORAL at 08:10

## 2024-02-07 RX ADMIN — Medication 15 MILLIGRAM(S): at 10:15

## 2024-02-07 RX ADMIN — MORPHINE SULFATE 4 MILLIGRAM(S): 50 CAPSULE, EXTENDED RELEASE ORAL at 09:10

## 2024-02-07 RX ADMIN — HYDROMORPHONE HYDROCHLORIDE 0.5 MILLIGRAM(S): 2 INJECTION INTRAMUSCULAR; INTRAVENOUS; SUBCUTANEOUS at 21:26

## 2024-02-07 RX ADMIN — SODIUM CHLORIDE 1000 MILLILITER(S): 9 INJECTION INTRAMUSCULAR; INTRAVENOUS; SUBCUTANEOUS at 11:14

## 2024-02-07 RX ADMIN — TAMSULOSIN HYDROCHLORIDE 0.8 MILLIGRAM(S): 0.4 CAPSULE ORAL at 21:47

## 2024-02-07 RX ADMIN — Medication 15 MILLIGRAM(S): at 07:00

## 2024-02-07 RX ADMIN — ONDANSETRON 4 MILLIGRAM(S): 8 TABLET, FILM COATED ORAL at 10:15

## 2024-02-07 NOTE — ED PROVIDER NOTE - CLINICAL SUMMARY MEDICAL DECISION MAKING FREE TEXT BOX
Patient diagnosed with renal colic a few hours ago now with uncontrolled pain and continued nausea and vomiting.  Will give IVF and medication for pain and nausea.  UTI noted from earlier visit.        Patient with continued pain and nausea, vomiting more controlled. Urology paged as patient will need to be admitted.

## 2024-02-07 NOTE — ED ADULT TRIAGE NOTE - DIRECT TO ROOM CARE INITIATED:
07-Feb-2024 03:21 Spironolactone Pregnancy And Lactation Text: This medication can cause feminization of the male fetus and should be avoided during pregnancy. The active metabolite is also found in breast milk.

## 2024-02-07 NOTE — H&P ADULT - PROBLEM SELECTOR PLAN 1
CT renal stone hunt ( 02/4/2024)  ( I personally review) with mild right hydroureteronephrosis to the level of a 2mm right distal ureteral calculus. Punctate nonobstructing left renal calculus. Hyperdense medullary pyramids may indicate concentrated urine or medullary calcinosis  IV fluid, flomax 0.8mg hs  Pain control--> IV dilaudid prn for severe pain  Urology consulted  Closely monitor renal fuction

## 2024-02-07 NOTE — CONSULT NOTE ADULT - ASSESSMENT
{\rtf1\piqrfd61633\ansi\lajiwvk2534\ftnbj\uc1\deff0  {\fonttbl{\f0 \fnil Segoe UI;}{\f1 \fnil \fcharset0 Segoe UI;}{\f2 \fnil Times [place];}}  {\colortbl ;\pet691\lernd865\xxbg749 ;\red0\green0\blue0 ;\red0\green0\blue0 ;}  {\stylesheet{\f0\fs20 Normal;}{\cs1 Default Paragraph Font;}{\cs2\f0\fs16 Line Number;}{\cs3\f2\fs24 Hyperlink;}}  {\*\revtbl{Unknown;}}  \wlywlv76721\bomlri13081\xbtqu4182\uioth7736\nxddn9331\byjpe1928\svnuszi270\kytoyki344\nogrowautofit\lxbaot976\formshade\nofeaturethrottle1\dntblnsbdb\fet4\aendnotes\aftnnrlc\pgbrdrhead\pgbrdrfoot  \sectd\ssgmpt46761\ihdzrz38367\guttersxn0\nnleemhs8263\jnefyjbh1137\xonpvpfg0310\uqtozsqh6555\fjfnqqq048\jznfyhj095\sbkpage\pgncont\pgndec  \plain\plain\f0\fs24\ql\plain\f0\fs24\plain\f0\fs20\ocdh0387\hich\f0\dbch\f0\loch\f0\fs20 34 Y/O female PMHx gallstones, nephrolithiasis hospitalized at [facility] in October for MET, sleeve gastrectomy 2023,  2018 presents c/o right flank pain since   last night. CT from  shows mild right hydroureteronephrosis to the level of a 2 mm right distal ureteral calculus. \par  Afebrile. UTI. \par  \par  \plain\f1\fs20\hevm5335\hich\f1\dbch\f1\loch\f1\cf2\fs20\strike\plain\f1\fs20\qnbr7921\hich\f1\dbch\f1\loch\f1\cf2\fs20\plain\f0\fs20\tfep0739\hich\f0\dbch\f0\loch\f0\fs20\par  PLAN: \par  \par  - Recommend admission to medicine for pain control \par  - Aggressive rehydration; IVF\par  - Repeat laboratory studies; trend renal function \par  - ABx \par  - Strain urine; monitor urine output; strict I&Os \par  - Start Flomax 0.8mg\par  - Discussed with Dr. Turner.\par  }   34 Y/O female PMHx gallstones, nephrolithiasis hospitalized at [facility] in October for MET, sleeve gastrectomy 2023,  2018 presents c/o right flank pain since last night. CT from  shows mild right hydroureteronephrosis to the level of a 2 mm right distal ureteral calculus.   Afebrile. UTI.       PLAN:     - Recommend admission to medicine for pain control   - Aggressive rehydration; IVF  - Repeat laboratory studies; trend renal function   - ABx   - Strain urine; monitor urine output; strict I&Os   - Start Flomax 0.8mg  - F/U UCx  - Discussed with Dr. Turner.

## 2024-02-07 NOTE — PATIENT PROFILE ADULT - NSPROGENPREVTRANSF_GEN_A_NUR
[facility] Hospitalist Progress Note    Date of Service: 2018    Chief Complaint  89 y.o. male admitted 2018 with Possible Stroke (Pt BIB family via wheelchair to triage for symptoms of possible stroke. pt reports left hand numbness/slurred speech that came on around 1555. Pt A&Ox4/ reports to be on Plavix. )    History of TIA and paroxysmal atiral fibrillation not on anticoagulation.  Presents with L hand numbness, dysarthria.  Symptoms resolved at ED.  Head CT no intracranial bleed  EKG  CTA head and neck, unremarkable  MRI No evidence of acute infarction, hemorrhage, or mass lesion and overall, no acute findings and no significant change since MRI brain 2017.  Echocardiogram normal EF, normal wall motion but has severe pulmonary hypertension  Plavix was started as she has an aspirin allergy (hives). Neurology consulted. Anticoagulation recommended. Eliquis started.     Interval Problem Update  . Cognitive deficits but pleasant. No acute issues or concerns.     Consultants/Specialty  Neurology    Disposition  Home Health Care planned.        Review of Systems   Unable to perform ROS: Mental acuity      Physical Exam  Laboratory/Imaging   Hemodynamics  Temp (24hrs), Av.7 °C (98 °F), Min:36.3 °C (97.4 °F), Max:37 °C (98.6 °F)   Temperature: 37 °C (98.6 °F)  Pulse  Av.4  Min: 54  Max: 86 Heart Rate (Monitored): 83  Blood Pressure : 154/81 (RN notified), NIBP: (!) 210/116      Respiratory      Respiration: 15, Pulse Oximetry: 95 %             Fluids    Intake/Output Summary (Last 24 hours) at 18 1419  Last data filed at 18 1200   Gross per 24 hour   Intake              440 ml   Output              500 ml   Net              -60 ml       Nutrition  Orders Placed This Encounter   Procedures   • Diet Order Regular     Standing Status:   Standing     Number of Occurrences:   1     Order Specific Question:   Diet:     Answer:   Regular [1]     Physical Exam   Constitutional: He appears  well-developed and well-nourished.   Elderly, frail     HENT:   Head: Normocephalic and atraumatic.   Eyes: Conjunctivae and EOM are normal. No scleral icterus.   Neck: Normal range of motion. Neck supple.   Cardiovascular: Normal rate and regular rhythm.  Exam reveals no gallop and no friction rub.    No murmur heard.  Irregular   Pulmonary/Chest: Effort normal and breath sounds normal. No respiratory distress. He has no wheezes. He has no rales.   Abdominal: Soft. Bowel sounds are normal. He exhibits no distension. There is no tenderness. There is no rebound and no guarding.   Musculoskeletal: He exhibits no edema or tenderness.   Neurological: He is alert.   Cognitive deficits   Skin: Skin is warm.   Psychiatric: He has a normal mood and affect. His behavior is normal.   Pleasant       Recent Labs      09/01/18   1659   WBC  6.2   RBC  5.35   HEMOGLOBIN  15.5   HEMATOCRIT  47.7   MCV  89.2   MCH  29.0   MCHC  32.5*   RDW  42.4   PLATELETCT  150*   MPV  9.3     Recent Labs      09/01/18   1659   SODIUM  136   POTASSIUM  4.1   CHLORIDE  102   CO2  26   GLUCOSE  115*   BUN  27*   CREATININE  1.12   CALCIUM  9.0     Recent Labs      09/01/18   1659   APTT  28.4   INR  1.08         Recent Labs      09/02/18   0229   TRIGLYCERIDE  32   HDL  67   LDL  62          Assessment/Plan     * TIA (transient ischemic attack)- (present on admission)   Assessment & Plan    Plavix  Statin  Neuroloogy recommends Eliquis. Reviewed with Pharmacy and guidelines state that given age, weight, kidney function he should be in full dose. However he also has a bleeding risk, I.e., age>75, history of CKD Stage 3, and him being started on Plavix, I will opt to reduce to 2.5mg for now.        Chronic kidney disease, stage III (moderate)- (present on admission)   Assessment & Plan    Avoid nephrotoxins        BPH (benign prostatic hyperplasia)- (present on admission)   Assessment & Plan    Proscar  Flomax        Paroxysmal atrial fibrillation  (HCC)- (present on admission)   Assessment & Plan    Rate is controlled   Will do Eliquis        Essential hypertension, benign- (present on admission)   Assessment & Plan    Home medications and titrate  after 24 hours permissive hypertension  Stable continue her home medications.          Quality-Core Measures       no history of blood product transfusion

## 2024-02-07 NOTE — H&P ADULT - ASSESSMENT
33 years old female with h/o gallstone, nephrolithiasis present to ED with complain of severe right flank pain. Patient reproted right flank pain which started on Monday, significantly worsened yesterday, radiate to pelvis area. Patient was seen in ED and was discharged home but a few hours after home arrival, patient started to have severe intractable pain associated with nausea and vomiting. Patient also noted dysuria as well.   Hemodynamically stable, afebrile, sat well at RA. WBC 14.79, plt 223, Cr 0.84. CT renal stone hunt ( 02/4/2024) with mild right hydroureteronephrosis to the level of a 2mm right distal ureteral calculus. Punctate nonobstructing left renal calculus. Hyperdense medullary pyramids may indicate concentrated urine or medullary calcinosis.

## 2024-02-07 NOTE — ED PROVIDER NOTE - OBJECTIVE STATEMENT
This patient is a 33 year old woman who presents to the ER c/o right flank pain, nausea and vomiting.  She was discharged a few hours ago from the ER with diagnosis of renal colic.  Patient states that she has continued nausea and vomiting and her pain is constant 10/10 in severity.  As per EMR patient was diagnosed with a 2mm stone.  Urology was consulted as patient also has UTI but was cleared and considered safe for discharge.

## 2024-02-07 NOTE — ED ADULT TRIAGE NOTE - CHIEF COMPLAINT QUOTE
pt recently discharge from ED 2hrs w/ r kidney stone ago dx with c/o nausea given 4mg Zofran via 20G L A/C

## 2024-02-07 NOTE — H&P ADULT - PROBLEM SELECTOR PLAN 2
CT renal stone hunt ( 02/4/2024) with mild right hydroureteronephrosis to the level of a 2mm right distal ureteral calculus. Punctate nonobstructing left renal calculus. Hyperdense medullary pyramids may indicate concentrated urine or medullary calcinosis.  Urology consulted, appreciate recommendation  Flomax 0.8mg hs  IV fluid  Pain control

## 2024-02-07 NOTE — H&P ADULT - PROBLEM SELECTOR PLAN 4
UA appear dirty, though also have squamous cell  Leukocytosis+,  dysuria+, suprapubic tenderness+  right CVA tenderness  Check urine culture, check blood culture  Continue ceftriaxone 1g daily

## 2024-02-07 NOTE — H&P ADULT - HISTORY OF PRESENT ILLNESS
33 years old female with h/o gallstone, nephrolithiasis present to ED with complain of severe right flank pain. Patient reproted right flank pain which started on Monday, significantly worsened yesterday, radiate to pelvis area. Patient was seen in ED and was discharged home but a few hours after home arrival, patient started to have severe intractable pain associated with nausea and vomiting. Patient also noted dysuria as well.   Hemodynamically stable, afebrile, sat well at RA. WBC 14.79, plt 223, Cr 0.84. CT renal stone hunt ( 02/4/2024) with mild right hydroureteronephrosis to the level of a 2mm right distal ureteral calculus. Punctate nonobstructing left renal calculus. Hyperdense medullary pyramids may indicate concentrated urine or medullary calcinosis.    SH: occasional alcohol use  FH: no family h/o HTN, DM

## 2024-02-07 NOTE — ED ADULT NURSE NOTE - OBJECTIVE STATEMENT
33 year old female A/Ox3 c/o abdominal pain radiating to the lower back, pt reports she has a hx of kidney stones, pt also reports she has a UTI, at this time pt c/o burning urination, pt denies blood in the urine and stool, pt c/o nausea but denies vomiting

## 2024-02-07 NOTE — CONSULT NOTE ADULT - SUBJECTIVE AND OBJECTIVE BOX
Chief Complaint:  Patient is a 33y old  Female who presents with a chief complaint of flank pain     HPI:    34 Y/O female with a PMHx gallstones, nephrolithiasis hospitalized at [facility] in October for MET, sleeve gastrectomy 2023,  2018 presents c/o right flank pain since last night. She visited the ED in the evening and was sent home with antibiotics and pain medication to pass the 2mm renal stone that was found on CT scan. She states that she was home for only an hour before the pain became unbearable and she was experiencing chills, N/V. She denies constipation, diarrhea, melena, hematochezia, hematuria, chest pain, shortness of breath, dizziness, cough.       PMH/PSH:PAST MEDICAL & SURGICAL HISTORY:  No pertinent past medical history      H/O gastric sleeve          Allergies:  No Known Allergies      Medications:      Review of Systems:  Negative except for HPI    Relevant Family History:   FAMILY HISTORY:      Relevant Social History: Alcohol ( -) , Tobacco ( -) , Illicit drugs (- )     Physical Exam:    Vital Signs:  Vital Signs Last 24 Hrs  T(C): 36.6 (2024 07:49), Max: 37.1 (2024 19:37)  T(F): 97.8 (2024 07:49), Max: 98.8 (2024 19:37)  HR: 69 (2024 07:49) (57 - 74)  BP: 122/77 (2024 07:49) (100/67 - 128/75)  BP(mean): --  RR: 19 (2024 07:49) (16 - 20)  SpO2: 99% (2024 07:49) (97% - 100%)    Parameters below as of 2024 07:49  Patient On (Oxygen Delivery Method): room air      Daily Height in cm: 165.1 (2024 03:18)    Daily     General:  Appears stated age, no distress  HEENT:  NC/AT,  conjunctivae clear and pink  Chest:  Full & symmetric excursion, no increased effort, breath sounds clear  Cardiovascular:  Regular rhythm, S1, S2  Abdomen:  Soft, non tender, non distended  : No vaginal discharge, suprapubic TTP, right-sided TTP  Extremities:  no cyanosis, clubbing or edema  Skin:  No rash/erythema/ecchymoses/petechiae/wounds/abscess/warm/dry  Neuro/Psych:  Alert, oriented, no asterixis    Laboratory:                          13.4   8.25  )-----------( 258      ( 2024 19:42 )             39.4     02-    141  |  109<H>  |  12  ----------------------------<  84  4.7   |  29  |  0.76    Ca    9.7      2024 19:42    TPro  7.4  /  Alb  3.5  /  TBili  0.4  /  DBili  x   /  AST  43<H>  /  ALT  97<H>  /  AlkPhos  53  -    LIVER FUNCTIONS - ( 2024 19:42 )  Alb: 3.5 g/dL / Pro: 7.4 gm/dL / ALK PHOS: 53 U/L / ALT: 97 U/L / AST: 43 U/L / GGT: x           PT/INR - ( 2024 19:42 )   PT: 11.1 sec;   INR: 0.93 ratio         PTT - ( 2024 19:42 )  PTT:29.1 sec  Urinalysis Basic - ( 2024 19:42 )    Color: Yellow / Appearance: Cloudy / S.022 / pH: x  Gluc: 84 mg/dL / Ketone: Trace mg/dL  / Bili: Negative / Urobili: 0.2 mg/dL   Blood: x / Protein: Trace mg/dL / Nitrite: Positive   Leuk Esterase: Moderate / RBC: 6-8 /HPF / WBC >50 /HPF   Sq Epi: x / Non Sq Epi: x / Bacteria: Moderate /HPF              Imaging:    < from: CT Renal Stone Hunt (24 @ 21:01) >    ACC: [id number] EXAM:  CT RENAL STONE HUNT   ORDERED BY: TONO QIU     PROCEDURE DATE:  2024          INTERPRETATION:  CLINICAL INFORMATION: 33 years  Female with right   Abdominal/flank pain, stone suspected.    COMPARISON: Contrast-enhanced CT abdomen and pelvis 2023    CONTRAST/COMPLICATIONS:  IV Contrast: NONE  Oral Contrast: NONE  Complications: None reported at time of study completion    PROCEDURE:  CT of the Abdomen and Pelvis was performed.  Sagittal and coronal reformats were performed.    LIMITATIONS: Evaluation of the solid organs, vascular structures and GI   tract is limited without oral and IV contrast.    FINDINGS:  LOWER CHEST: Within normal limits.    LIVER: Within normal limits.  BILE DUCTS: Normal caliber.  GALLBLADDER: Within normal limits.  SPLEEN: Within normal limits.  PANCREAS: Within normal limits.  ADRENALS: Within normal limits.  KIDNEYS/URETERS: Punctate nonobstructing left upper pole calculus. No   left hydronephrosis.  Mild right hydroureteronephrosis to the level of a 2 mm right distal   ureteral calculus (2:111)..  Hyperdense medullary. Minutes.    BLADDER: Within normal limits.  REPRODUCTIVE ORGANS: Prominent uterus.    BOWEL: No bowel obstruction. Appendix is normal.. Sleeve gastrectomy.   Small sliding hiatus hernia.  PERITONEUM: No ascites.  VESSELS: Within normal limits.  RETROPERITONEUM/LYMPH NODES: No lymphadenopathy.  ABDOMINAL WALL: Within normal limits.  BONES: Within normal limits.    IMPRESSION:    Mild right hydroureteronephrosis to the level of a 2 mm right distal   ureteral calculus. Punctate nonobstructing left renal calculus.    Hyperdense medullary pyramids may indicate concentrated urine or   medullary calcinosis.    Sleeve gastrectomy.          --- End of Report ---            MARIE HOWARD MD; Attending Radiologist  This document has been electronically signed. 2024  9:15PM    < end of copied text >

## 2024-02-07 NOTE — H&P ADULT - NSHPPHYSICALEXAM_GEN_ALL_CORE
CONSTITUTIONAL: alert and cooperative, no acute distress.   EYES: PERRL, no scleral icterus  ENT: Mucosa moist, tongue normal.  NECK: Neck supple, trachea midline, non-tender  CARDIAC: Normal S1 and S2. Regular rate and rhythms. No Pedal edema. Peripheral pulses intact  LUNGS: Equal air entry both lungs. No rales, rhonchi, wheezing. Normal respiratory effort.   ABDOMEN: Soft, mild suprapubic tenderness+. No guarding or rebound tenderness. No hepatomegaly or splenomegaly. Bowel sound normal.  MUSCULOSKELETAL: Normocephalic, atraumatic. Right CVA tenderness +. No significant deformity or joint abnormality  NEUROLOGICAL: No gross motor or sensory deficits  SKIN: no lesions or eruptions. Normal turgor  PSYCHIATRIC: A&O x 3, appropriate mood and affect

## 2024-02-07 NOTE — ED ADULT NURSE NOTE - NSFALLUNIVINTERV_ED_ALL_ED
Bed/Stretcher in lowest position, wheels locked, appropriate side rails in place/Call bell, personal items and telephone in reach/Instruct patient to call for assistance before getting out of bed/chair/stretcher/Non-slip footwear applied when patient is off stretcher/[place] to call system/Physically safe environment - no spills, clutter or unnecessary equipment/Purposeful proactive rounding/Room/bathroom lighting operational, light cord in reach

## 2024-02-07 NOTE — ED PROVIDER NOTE - PROGRESS NOTE DETAILS
Patient reporting continued pain.  Surgical PA covering urology paged Clifford: Pt care signed out to me at change of shift, pending Urology recommendations. Urology recommends admission to medicine for continued hydration and pain control. Will rpt labs, additional medications for pain / nausea ordered. Pt, family updated. They understand / agree w/ this plan.

## 2024-02-08 LAB
ALBUMIN SERPL ELPH-MCNC: 2.2 G/DL — LOW (ref 3.3–5)
ALP SERPL-CCNC: 36 U/L — LOW (ref 40–120)
ALT FLD-CCNC: 43 U/L — SIGNIFICANT CHANGE UP (ref 12–78)
ANION GAP SERPL CALC-SCNC: 4 MMOL/L — LOW (ref 5–17)
AST SERPL-CCNC: 13 U/L — LOW (ref 15–37)
BILIRUB SERPL-MCNC: 0.4 MG/DL — SIGNIFICANT CHANGE UP (ref 0.2–1.2)
BUN SERPL-MCNC: 11 MG/DL — SIGNIFICANT CHANGE UP (ref 7–23)
CALCIUM SERPL-MCNC: 8.2 MG/DL — LOW (ref 8.5–10.1)
CHLORIDE SERPL-SCNC: 110 MMOL/L — HIGH (ref 96–108)
CO2 SERPL-SCNC: 27 MMOL/L — SIGNIFICANT CHANGE UP (ref 22–31)
CREAT SERPL-MCNC: 0.8 MG/DL — SIGNIFICANT CHANGE UP (ref 0.5–1.3)
EGFR: 100 ML/MIN/1.73M2 — SIGNIFICANT CHANGE UP
GLUCOSE SERPL-MCNC: 82 MG/DL — SIGNIFICANT CHANGE UP (ref 70–99)
HCT VFR BLD CALC: 30.9 % — LOW (ref 34.5–45)
HGB BLD-MCNC: 10.1 G/DL — LOW (ref 11.5–15.5)
MAGNESIUM SERPL-MCNC: 1.7 MG/DL — SIGNIFICANT CHANGE UP (ref 1.6–2.6)
MCHC RBC-ENTMCNC: 31 PG — SIGNIFICANT CHANGE UP (ref 27–34)
MCHC RBC-ENTMCNC: 32.7 G/DL — SIGNIFICANT CHANGE UP (ref 32–36)
MCV RBC AUTO: 94.8 FL — SIGNIFICANT CHANGE UP (ref 80–100)
NRBC # BLD: 0 /100 WBCS — SIGNIFICANT CHANGE UP (ref 0–0)
PHOSPHATE SERPL-MCNC: 2.7 MG/DL — SIGNIFICANT CHANGE UP (ref 2.5–4.5)
PLATELET # BLD AUTO: 173 K/UL — SIGNIFICANT CHANGE UP (ref 150–400)
POTASSIUM SERPL-MCNC: 3.6 MMOL/L — SIGNIFICANT CHANGE UP (ref 3.5–5.3)
POTASSIUM SERPL-SCNC: 3.6 MMOL/L — SIGNIFICANT CHANGE UP (ref 3.5–5.3)
PROT SERPL-MCNC: 5 GM/DL — LOW (ref 6–8.3)
RBC # BLD: 3.26 M/UL — LOW (ref 3.8–5.2)
RBC # FLD: 12.5 % — SIGNIFICANT CHANGE UP (ref 10.3–14.5)
SODIUM SERPL-SCNC: 141 MMOL/L — SIGNIFICANT CHANGE UP (ref 135–145)
WBC # BLD: 8.77 K/UL — SIGNIFICANT CHANGE UP (ref 3.8–10.5)
WBC # FLD AUTO: 8.77 K/UL — SIGNIFICANT CHANGE UP (ref 3.8–10.5)

## 2024-02-08 PROCEDURE — 99232 SBSQ HOSP IP/OBS MODERATE 35: CPT

## 2024-02-08 RX ORDER — MORPHINE SULFATE 50 MG/1
4 CAPSULE, EXTENDED RELEASE ORAL EVERY 6 HOURS
Refills: 0 | Status: DISCONTINUED | OUTPATIENT
Start: 2024-02-08 | End: 2024-02-08

## 2024-02-08 RX ORDER — MORPHINE SULFATE 50 MG/1
4 CAPSULE, EXTENDED RELEASE ORAL EVERY 4 HOURS
Refills: 0 | Status: DISCONTINUED | OUTPATIENT
Start: 2024-02-08 | End: 2024-02-09

## 2024-02-08 RX ADMIN — ONDANSETRON 4 MILLIGRAM(S): 8 TABLET, FILM COATED ORAL at 20:17

## 2024-02-08 RX ADMIN — PANTOPRAZOLE SODIUM 40 MILLIGRAM(S): 20 TABLET, DELAYED RELEASE ORAL at 13:43

## 2024-02-08 RX ADMIN — MORPHINE SULFATE 4 MILLIGRAM(S): 50 CAPSULE, EXTENDED RELEASE ORAL at 20:30

## 2024-02-08 RX ADMIN — SODIUM CHLORIDE 150 MILLILITER(S): 9 INJECTION, SOLUTION INTRAVENOUS at 05:44

## 2024-02-08 RX ADMIN — MORPHINE SULFATE 4 MILLIGRAM(S): 50 CAPSULE, EXTENDED RELEASE ORAL at 20:09

## 2024-02-08 RX ADMIN — SODIUM CHLORIDE 150 MILLILITER(S): 9 INJECTION, SOLUTION INTRAVENOUS at 21:14

## 2024-02-08 RX ADMIN — HYDROMORPHONE HYDROCHLORIDE 0.5 MILLIGRAM(S): 2 INJECTION INTRAMUSCULAR; INTRAVENOUS; SUBCUTANEOUS at 03:25

## 2024-02-08 RX ADMIN — CEFTRIAXONE 100 MILLIGRAM(S): 500 INJECTION, POWDER, FOR SOLUTION INTRAMUSCULAR; INTRAVENOUS at 05:44

## 2024-02-08 RX ADMIN — HYDROMORPHONE HYDROCHLORIDE 0.5 MILLIGRAM(S): 2 INJECTION INTRAMUSCULAR; INTRAVENOUS; SUBCUTANEOUS at 14:18

## 2024-02-08 RX ADMIN — HYDROMORPHONE HYDROCHLORIDE 0.5 MILLIGRAM(S): 2 INJECTION INTRAMUSCULAR; INTRAVENOUS; SUBCUTANEOUS at 03:06

## 2024-02-08 RX ADMIN — SENNA PLUS 2 TABLET(S): 8.6 TABLET ORAL at 21:13

## 2024-02-08 RX ADMIN — TAMSULOSIN HYDROCHLORIDE 0.8 MILLIGRAM(S): 0.4 CAPSULE ORAL at 21:13

## 2024-02-08 NOTE — PROGRESS NOTE ADULT - NSPROGADDITIONALINFOA_GEN_ALL_CORE
Attg.    Seems fine and comfortable  Can D/C as numbers fine.  Arrange for out pt fu to document passage/resolution of hydronephrosis.

## 2024-02-09 ENCOUNTER — TRANSCRIPTION ENCOUNTER (OUTPATIENT)
Age: 34
End: 2024-02-09

## 2024-02-09 VITALS
RESPIRATION RATE: 18 BRPM | SYSTOLIC BLOOD PRESSURE: 129 MMHG | DIASTOLIC BLOOD PRESSURE: 82 MMHG | OXYGEN SATURATION: 98 % | TEMPERATURE: 98 F | HEART RATE: 63 BPM

## 2024-02-09 LAB
-  AMOXICILLIN/CLAVULANIC ACID: SIGNIFICANT CHANGE UP
-  AMPICILLIN/SULBACTAM: SIGNIFICANT CHANGE UP
-  AMPICILLIN: SIGNIFICANT CHANGE UP
-  AZTREONAM: SIGNIFICANT CHANGE UP
-  CEFAZOLIN: SIGNIFICANT CHANGE UP
-  CEFEPIME: SIGNIFICANT CHANGE UP
-  CEFOXITIN: SIGNIFICANT CHANGE UP
-  CEFTRIAXONE: SIGNIFICANT CHANGE UP
-  CIPROFLOXACIN: SIGNIFICANT CHANGE UP
-  ERTAPENEM: SIGNIFICANT CHANGE UP
-  GENTAMICIN: SIGNIFICANT CHANGE UP
-  IMIPENEM: SIGNIFICANT CHANGE UP
-  LEVOFLOXACIN: SIGNIFICANT CHANGE UP
-  MEROPENEM: SIGNIFICANT CHANGE UP
-  NITROFURANTOIN: SIGNIFICANT CHANGE UP
-  PIPERACILLIN/TAZOBACTAM: SIGNIFICANT CHANGE UP
-  TOBRAMYCIN: SIGNIFICANT CHANGE UP
-  TRIMETHOPRIM/SULFAMETHOXAZOLE: SIGNIFICANT CHANGE UP
ANION GAP SERPL CALC-SCNC: 6 MMOL/L — SIGNIFICANT CHANGE UP (ref 5–17)
BUN SERPL-MCNC: 8 MG/DL — SIGNIFICANT CHANGE UP (ref 7–23)
CALCIUM SERPL-MCNC: 8.9 MG/DL — SIGNIFICANT CHANGE UP (ref 8.5–10.1)
CHLORIDE SERPL-SCNC: 109 MMOL/L — HIGH (ref 96–108)
CO2 SERPL-SCNC: 27 MMOL/L — SIGNIFICANT CHANGE UP (ref 22–31)
CREAT SERPL-MCNC: 0.52 MG/DL — SIGNIFICANT CHANGE UP (ref 0.5–1.3)
CULTURE RESULTS: ABNORMAL
EGFR: 127 ML/MIN/1.73M2 — SIGNIFICANT CHANGE UP
GLUCOSE SERPL-MCNC: 74 MG/DL — SIGNIFICANT CHANGE UP (ref 70–99)
METHOD TYPE: SIGNIFICANT CHANGE UP
ORGANISM # SPEC MICROSCOPIC CNT: ABNORMAL
ORGANISM # SPEC MICROSCOPIC CNT: SIGNIFICANT CHANGE UP
POTASSIUM SERPL-MCNC: 3.6 MMOL/L — SIGNIFICANT CHANGE UP (ref 3.5–5.3)
POTASSIUM SERPL-SCNC: 3.6 MMOL/L — SIGNIFICANT CHANGE UP (ref 3.5–5.3)
SODIUM SERPL-SCNC: 142 MMOL/L — SIGNIFICANT CHANGE UP (ref 135–145)
SPECIMEN SOURCE: SIGNIFICANT CHANGE UP

## 2024-02-09 PROCEDURE — 99239 HOSP IP/OBS DSCHRG MGMT >30: CPT

## 2024-02-09 RX ORDER — TAMSULOSIN HYDROCHLORIDE 0.4 MG/1
2 CAPSULE ORAL
Qty: 0 | Refills: 0 | DISCHARGE
Start: 2024-02-09

## 2024-02-09 RX ORDER — OXYCODONE AND ACETAMINOPHEN 5; 325 MG/1; MG/1
1 TABLET ORAL EVERY 6 HOURS
Refills: 0 | Status: DISCONTINUED | OUTPATIENT
Start: 2024-02-09 | End: 2024-02-09

## 2024-02-09 RX ORDER — OXYCODONE AND ACETAMINOPHEN 5; 325 MG/1; MG/1
2 TABLET ORAL EVERY 6 HOURS
Refills: 0 | Status: DISCONTINUED | OUTPATIENT
Start: 2024-02-09 | End: 2024-02-09

## 2024-02-09 RX ORDER — OXYCODONE AND ACETAMINOPHEN 5; 325 MG/1; MG/1
1 TABLET ORAL
Qty: 12 | Refills: 0
Start: 2024-02-09 | End: 2024-02-11

## 2024-02-09 RX ORDER — CEFUROXIME AXETIL 250 MG
1 TABLET ORAL
Qty: 0 | Refills: 0 | DISCHARGE
Start: 2024-02-09

## 2024-02-09 RX ADMIN — CEFTRIAXONE 100 MILLIGRAM(S): 500 INJECTION, POWDER, FOR SOLUTION INTRAMUSCULAR; INTRAVENOUS at 05:04

## 2024-02-09 RX ADMIN — SODIUM CHLORIDE 150 MILLILITER(S): 9 INJECTION, SOLUTION INTRAVENOUS at 05:03

## 2024-02-09 RX ADMIN — Medication 650 MILLIGRAM(S): at 10:00

## 2024-02-09 RX ADMIN — SODIUM CHLORIDE 150 MILLILITER(S): 9 INJECTION, SOLUTION INTRAVENOUS at 10:13

## 2024-02-09 RX ADMIN — PANTOPRAZOLE SODIUM 40 MILLIGRAM(S): 20 TABLET, DELAYED RELEASE ORAL at 07:22

## 2024-02-09 RX ADMIN — MORPHINE SULFATE 4 MILLIGRAM(S): 50 CAPSULE, EXTENDED RELEASE ORAL at 09:19

## 2024-02-09 RX ADMIN — MORPHINE SULFATE 4 MILLIGRAM(S): 50 CAPSULE, EXTENDED RELEASE ORAL at 10:00

## 2024-02-09 RX ADMIN — Medication 650 MILLIGRAM(S): at 09:19

## 2024-02-09 NOTE — DISCHARGE NOTE NURSING/CASE MANAGEMENT/SOCIAL WORK - PATIENT PORTAL LINK FT
You can access the FollowMyHealth Patient Portal offered by [facility] by registering at the following website: [email or web address]. By joining [employer]’s FollowMyHealth portal, you will also be able to view your health information using other applications (apps) compatible with our system.

## 2024-02-09 NOTE — DISCHARGE NOTE PROVIDER - NSDCCPCAREPLAN_GEN_ALL_CORE_FT
PRINCIPAL DISCHARGE DIAGNOSIS  Diagnosis: Kidney stone  Assessment and Plan of Treatment: 33 years old female with h/o gallstone, nephrolithiasis present to ED with complain of severe right flank pain. Patient reproted right flank pain which started on Monday, significantly worsened yesterday, radiate to pelvis area. Patient was seen in ED and was discharged home but a few hours after home arrival, patient started to have severe intractable pain associated with nausea and vomiting. Patient also noted dysuria as well.   Hemodynamically stable, afebrile, sat well at RA. WBC 14.79, plt 223, Cr 0.84. CT renal stone hunt ( 02/4/2024) with mild right hydroureteronephrosis to the level of a 2mm right distal ureteral calculus. Punctate nonobstructing left renal calculus. Hyperdense medullary pyramids may indicate concentrated urine or medullary calcinosis.  sepsis poa 2/2 right nephrolithiasis with  Hydronephrosis and uti    -CT renal stone hunt  with mild right hydroureteronephrosis to the level of a 2mm right distal ureteral calculus. Punctate nonobstructing left renal calculus.   -kidney functions remains normal and is afebrile   - c/w  flomax 0.8mg hs and analgesia   - urine cx pos for gram negative rods   - discussed with urology and pt can follow up with empiric antibiotics early next week   - c/w straining protocol   - eating and drinking well

## 2024-02-09 NOTE — PROGRESS NOTE ADULT - REASON FOR ADMISSION
hydronephrosis, nephrolithiasis, UTI, intractable pain

## 2024-02-09 NOTE — DISCHARGE NOTE PROVIDER - ATTENDING DISCHARGE PHYSICAL EXAMINATION:
GENERAL: NAD, well-groomed, well-developed  HEAD:  Atraumatic, Normocephalic  EYES: EOMI, PERRLA, conjunctiva and sclera clear  ENMT: No tonsillar erythema, exudates, or enlargement; Moist mucous membranes, No lesions  NECK: Supple, No JVD, Normal thyroid  NERVOUS SYSTEM:  Alert & Oriented X3, Good concentration DTRs 2+ intact and symmetric  CHEST/LUNG: Clear to percussion bilaterally; No rales, rhonchi, wheezing, or rubs  HEART: Regular rate and rhythm; No murmurs, rubs, or gallops  ABDOMEN: Soft, Nontender, Nondistended; Bowel sounds present. mild right flank pain   EXTREMITIES: No edema   LYMPH: No lymphadenopathy noted

## 2024-02-09 NOTE — DISCHARGE NOTE PROVIDER - NSDCMRMEDTOKEN_GEN_ALL_CORE_FT
acetaminophen 500 mg oral capsule: 2 cap(s) orally every 8 hours as needed for  moderate pain  cefpodoxime 200 mg oral tablet: 1 tab(s) orally 2 times a day  Colace 100 mg oral capsule: 1 cap(s) orally 2 times a day  oxycodone-acetaminophen 5 mg-325 mg oral tablet: 1 tab(s) orally every 6 hours as needed for Moderate Pain (4 - 6) MDD: 4  pantoprazole 40 mg oral delayed release tablet: 1 tab(s) orally once a day  senna leaf extract oral tablet: 2 tab(s) orally once a day (at bedtime)  tamsulosin 0.4 mg oral capsule: 2 cap(s) orally once a day (at bedtime)   acetaminophen 500 mg oral capsule: 2 cap(s) orally every 8 hours as needed for  moderate pain  cefuroxime 500 mg oral tablet: 1 tab(s) orally 2 times a day take for 7 days  Colace 100 mg oral capsule: 1 cap(s) orally 2 times a day  oxycodone-acetaminophen 5 mg-325 mg oral tablet: 1 tab(s) orally every 6 hours as needed for Moderate Pain (4 - 6) MDD: 4  pantoprazole 40 mg oral delayed release tablet: 1 tab(s) orally once a day  senna leaf extract oral tablet: 2 tab(s) orally once a day (at bedtime)  tamsulosin 0.4 mg oral capsule: 2 cap(s) orally once a day (at bedtime)

## 2024-02-09 NOTE — PROGRESS NOTE ADULT - SUBJECTIVE AND OBJECTIVE BOX
SURGERY PROGRESS HPI:  Pt seen and examined at bedside. Pain is well controlled, pt denies complaints. No acute events overnight.       Vital Signs Last 24 Hrs  T(C): 36.7 (09 Feb 2024 11:29), Max: 36.8 (08 Feb 2024 17:12)  T(F): 98 (09 Feb 2024 11:29), Max: 98.2 (08 Feb 2024 17:12)  HR: 55 (09 Feb 2024 11:29) (55 - 65)  BP: 116/74 (09 Feb 2024 11:29) (112/73 - 117/77)  BP(mean): --  RR: 18 (09 Feb 2024 11:29) (18 - 18)  SpO2: 96% (09 Feb 2024 11:29) (95% - 98%)    Parameters below as of 09 Feb 2024 11:29  Patient On (Oxygen Delivery Method): room air          PHYSICAL EXAM:    GENERAL: NAD  HEAD:  Atraumatic, Normocephalic  CHEST/LUNG: Normal work of breathing  HEART: RRR  ABDOMEN: non distended, soft, non tender, no guarding  EXTREMITIES:  calf soft, non tender b/l    I&O's Detail    08 Feb 2024 07:01  -  09 Feb 2024 07:00  --------------------------------------------------------  IN:    Lactated Ringers: 2000 mL    Oral Fluid: 240 mL  Total IN: 2240 mL    OUT:  Total OUT: 0 mL    Total NET: 2240 mL          LABS:                        10.1   8.77  )-----------( 173      ( 08 Feb 2024 07:10 )             30.9     02-09    142  |  109<H>  |  8   ----------------------------<  74  3.6   |  27  |  0.52    Ca    8.9      09 Feb 2024 07:36  Phos  2.7     02-08  Mg     1.7     02-08    TPro  5.0<L>  /  Alb  2.2<L>  /  TBili  0.4  /  DBili  x   /  AST  13<L>  /  ALT  43  /  AlkPhos  36<L>  02-08      Urinalysis Basic - ( 09 Feb 2024 07:36 )    Color: x / Appearance: x / SG: x / pH: x  Gluc: 74 mg/dL / Ketone: x  / Bili: x / Urobili: x   Blood: x / Protein: x / Nitrite: x   Leuk Esterase: x / RBC: x / WBC x   Sq Epi: x / Non Sq Epi: x / Bacteria: x      Culture Results:   No growth at 24 hours (02-07 @ 12:30)  Culture Results:   No growth at 24 hours (02-07 @ 12:15)  Culture Results:   >100,000 CFU/ml Gram Negative Rods (02-06 @ 19:47)    
Patient is a 33y old  Female who presents with a chief complaint of hydronephrosis, nephrolithiasis, UTI, intractable pain (07 Feb 2024 11:25)      INTERVAL HPI/OVERNIGHT EVENTS: still with flank pain, but controlled. tolerating po. afebrile     MEDICATIONS  (STANDING):  cefTRIAXone   IVPB 1000 milliGRAM(s) IV Intermittent every 24 hours  influenza   Vaccine 0.5 milliLiter(s) IntraMuscular once  lactated ringers. 1000 milliLiter(s) (150 mL/Hr) IV Continuous <Continuous>  pantoprazole    Tablet 40 milliGRAM(s) Oral before breakfast  senna 2 Tablet(s) Oral at bedtime  tamsulosin 0.8 milliGRAM(s) Oral at bedtime    MEDICATIONS  (PRN):  acetaminophen     Tablet .. 650 milliGRAM(s) Oral every 6 hours PRN Temp greater or equal to 38C (100.4F), Mild Pain (1 - 3)  HYDROmorphone  Injectable 0.5 milliGRAM(s) IV Push every 4 hours PRN Severe Pain (7 - 10)  ondansetron Injectable 4 milliGRAM(s) IV Push every 6 hours PRN Nausea and/or Vomiting  oxyCODONE    IR 5 milliGRAM(s) Oral every 6 hours PRN Moderate Pain (4 - 6)      Allergies    No Known Allergies    Intolerances        REVIEW OF SYSTEMS:  CONSTITUTIONAL: No fever, weight loss  EYES: No eye pain, visual disturbances, or discharge  ENMT:  No difficulty hearing, tinnitus, vertigo; No sinus or throat pain  RESPIRATORY: No cough, wheezing, chills or hemoptysis; No shortness of breath  CARDIOVASCULAR: No chest pain, palpitations, dizziness, or leg swelling  GASTROINTESTINAL: No abdominal or epigastric pain. No nausea, vomiting, or hematemesis; No diarrhea or constipation. No melena or hematochezia.  GENITOURINARY: No dysuria, frequency, hematuria, or incontinence  NEUROLOGICAL: No headaches, memory loss, loss of strength, numbness, or tremors  SKIN: No itching, burning, rashes, or lesions   MUSCULOSKELETAL: No joint pain or swelling; No muscle, back, or extremity pain  PSYCHIATRIC: No depression, anxiety, mood swings, or difficulty sleeping  HEME/LYMPH: No easy bruising, or bleeding gums      Vital Signs Last 24 Hrs  T(C): 36.8 (08 Feb 2024 11:29), Max: 37.2 (07 Feb 2024 19:59)  T(F): 98.3 (08 Feb 2024 11:29), Max: 99 (07 Feb 2024 19:59)  HR: 61 (08 Feb 2024 11:29) (57 - 70)  BP: 99/63 (08 Feb 2024 11:29) (94/60 - 121/77)  BP(mean): 85 (07 Feb 2024 15:48) (85 - 85)  RR: 18 (08 Feb 2024 11:29) (18 - 18)  SpO2: 96% (08 Feb 2024 11:29) (96% - 99%)    Parameters below as of 08 Feb 2024 11:29  Patient On (Oxygen Delivery Method): room air        PHYSICAL EXAM:  GENERAL: NAD  HEAD:  Atraumatic, Normocephalic  EYES: EOMI, PERRLA, conjunctiva and sclera clear  ENMT: No tonsillar erythema, exudates, or enlargement;   NECK: Supple, Normal thyroid  NERVOUS SYSTEM:  Alert & Oriented X3, Good concentration; Motor Strength 5/5 B/L upper and lower extremities; DTRs 2+ intact and symmetric  CHEST/LUNG: CTABL; No rales, rhonchi, wheezing, or rubs  HEART: Regular rate and rhythm; No murmurs, rubs, or gallops  ABDOMEN: Soft, Nontender, Nondistended; Bowel sounds present, right flank tenderness   EXTREMITIES:  2+ Peripheral Pulses, No clubbing, cyanosis, or edema  LYMPH: No lymphadenopathy noted  SKIN: No rashes or lesions    LABS:                        10.1   8.77  )-----------( 173      ( 08 Feb 2024 07:10 )             30.9     02-08    141  |  110<H>  |  11  ----------------------------<  82  3.6   |  27  |  0.80    Ca    8.2<L>      08 Feb 2024 07:10  Phos  2.7     02-08  Mg     1.7     02-08    TPro  5.0<L>  /  Alb  2.2<L>  /  TBili  0.4  /  DBili  x   /  AST  13<L>  /  ALT  43  /  AlkPhos  36<L>  02-08    PT/INR - ( 06 Feb 2024 19:42 )   PT: 11.1 sec;   INR: 0.93 ratio         PTT - ( 06 Feb 2024 19:42 )  PTT:29.1 sec  Urinalysis Basic - ( 08 Feb 2024 07:10 )    Color: x / Appearance: x / SG: x / pH: x  Gluc: 82 mg/dL / Ketone: x  / Bili: x / Urobili: x   Blood: x / Protein: x / Nitrite: x   Leuk Esterase: x / RBC: x / WBC x   Sq Epi: x / Non Sq Epi: x / Bacteria: x      CAPILLARY BLOOD GLUCOSE          RADIOLOGY & ADDITIONAL TESTS:    Imaging Personally Reviewed:  [ ] YES  [ ] NO    Consultant(s) Notes Reviewed:  [ ] YES  [ ] NO    Care Discussed with Consultants/Other Providers [ ] YES  [ ] NO
Patient seen and examined at bedside, patient states that she has sand like substance in her urine.  Continues to complain of right flank pain, however, somewhat improved.      MEDICATIONS  (STANDING):  cefTRIAXone   IVPB 1000 milliGRAM(s) IV Intermittent every 24 hours  influenza   Vaccine 0.5 milliLiter(s) IntraMuscular once  lactated ringers. 1000 milliLiter(s) (150 mL/Hr) IV Continuous <Continuous>  pantoprazole    Tablet 40 milliGRAM(s) Oral before breakfast  senna 2 Tablet(s) Oral at bedtime  tamsulosin 0.8 milliGRAM(s) Oral at bedtime    MEDICATIONS  (PRN):  acetaminophen     Tablet .. 650 milliGRAM(s) Oral every 6 hours PRN Temp greater or equal to 38C (100.4F), Mild Pain (1 - 3)  HYDROmorphone  Injectable 0.5 milliGRAM(s) IV Push every 4 hours PRN Severe Pain (7 - 10)  ondansetron Injectable 4 milliGRAM(s) IV Push every 6 hours PRN Nausea and/or Vomiting  oxyCODONE    IR 5 milliGRAM(s) Oral every 6 hours PRN Moderate Pain (4 - 6)      Vital Signs Last 24 Hrs  T(C): 36.8 (08 Feb 2024 17:12), Max: 37.2 (07 Feb 2024 19:59)  T(F): 98.2 (08 Feb 2024 17:12), Max: 99 (07 Feb 2024 19:59)  HR: 65 (08 Feb 2024 17:12) (57 - 65)  BP: 113/70 (08 Feb 2024 17:12) (94/60 - 121/77)  RR: 18 (08 Feb 2024 17:12) (18 - 18)  SpO2: 98% (08 Feb 2024 17:12) (96% - 99%)    Parameters below as of 08 Feb 2024 17:12  Patient On (Oxygen Delivery Method): room air      PHYSICAL EXAM:  General:  Appears stated age, no distress  HEENT:  NC/AT,  conjunctivae clear and pink  Chest:  Good inspiratory effort, no respiratory distress  Cardiovascular:  S1, S2  Abdomen:  Soft, non tender, non distended  : suprapubic TTP, R CVA tenderness  Extremities:  no cyanosis, clubbing or edema  Skin:  No rash/erythema/ecchymoses/petechiae/wounds/abscess/warm/dry  Neuro/Psych:  Alert, oriented, no asterixis      LABS:                        10.1   8.77  )-----------( 173      ( 08 Feb 2024 07:10 )             30.9     02-08    141  |  110<H>  |  11  ----------------------------<  82  3.6   |  27  |  0.80    Ca    8.2<L>      08 Feb 2024 07:10  Phos  2.7     02-08  Mg     1.7     02-08    TPro  5.0<L>  /  Alb  2.2<L>  /  TBili  0.4  /  DBili  x   /  AST  13<L>  /  ALT  43  /  AlkPhos  36<L>  02-08    PT/INR - ( 06 Feb 2024 19:42 )   PT: 11.1 sec;   INR: 0.93 ratio         PTT - ( 06 Feb 2024 19:42 )  PTT:29.1 sec  Urinalysis Basic - ( 08 Feb 2024 07:10 )    Color: x / Appearance: x / SG: x / pH: x  Gluc: 82 mg/dL / Ketone: x  / Bili: x / Urobili: x   Blood: x / Protein: x / Nitrite: x   Leuk Esterase: x / RBC: x / WBC x   Sq Epi: x / Non Sq Epi: x / Bacteria: x

## 2024-02-09 NOTE — DISCHARGE NOTE PROVIDER - HOSPITAL COURSE
33 years old female with h/o gallstone, nephrolithiasis present to ED with complain of severe right flank pain. Patient reproted right flank pain which started on Monday, significantly worsened yesterday, radiate to pelvis area. Patient was seen in ED and was discharged home but a few hours after home arrival, patient started to have severe intractable pain associated with nausea and vomiting. Patient also noted dysuria as well.   Hemodynamically stable, afebrile, sat well at RA. WBC 14.79, plt 223, Cr 0.84. CT renal stone hunt ( 02/4/2024) with mild right hydroureteronephrosis to the level of a 2mm right distal ureteral calculus. Punctate nonobstructing left renal calculus. Hyperdense medullary pyramids may indicate concentrated urine or medullary calcinosis.    sepsis poa 2/2 right nephrolithiasis with  Hydronephrosis and uti    -CT renal stone hunt  with mild right hydroureteronephrosis to the level of a 2mm right distal ureteral calculus. Punctate nonobstructing left renal calculus. Hyperdense medullary pyramids may indicate concentrated urine or medullary calcinosis  -kidney functions remains normal and is afebrile   - c/w  flomax 0.8mg hs and analgesia   - urine cx pot for gram negative rods   - discussed with urology and pt can follow up with empiric antibiotics early next week   - c/w straining protocol   - eating and drinking well      Acute UTI. with sepsis poa

## 2024-02-09 NOTE — DISCHARGE NOTE PROVIDER - CARE PROVIDER_API CALL
Terrence Waite  Urology  [address]  [place] [id number]  Phone: [phone]  Fax: [phone]  Follow Up Time:

## 2024-02-09 NOTE — DISCHARGE NOTE NURSING/CASE MANAGEMENT/SOCIAL WORK - NSDCPEFALRISK_GEN_ALL_CORE
For information on Fall & Injury Prevention, visit: [email or web address] OR  [email or web address] OR  https://www.cdc.gov/steadi/patient.html

## 2024-02-09 NOTE — PROGRESS NOTE ADULT - ASSESSMENT
3 years old female with h/o gallstone, nephrolithiasis present to ED with complain of severe right flank pain. Patient reproted right flank pain which started on Monday, significantly worsened yesterday, radiate to pelvis area. Patient was seen in ED and was discharged home but a few hours after home arrival, patient started to have severe intractable pain associated with nausea and vomiting. Patient also noted dysuria as well.   Hemodynamically stable, afebrile, sat well at RA. WBC 14.79, plt 223, Cr 0.84. CT renal stone hunt ( 02/4/2024) with mild right hydroureteronephrosis to the level of a 2mm right distal ureteral calculus. Punctate nonobstructing left renal calculus. Hyperdense medullary pyramids may indicate concentrated urine or medullary calcinosis.    sepsis poa 2/2 right nephrolithiasis with  Hydronephrosis and uti    -CT renal stone hunt ( 02/4/2024)  ( I personally review) with mild right hydroureteronephrosis to the level of a 2mm right distal ureteral calculus. Punctate nonobstructing left renal calculus. Hyperdense medullary pyramids may indicate concentrated urine or medullary calcinosis  -IV fluid, flomax 0.8mg hs  -Pain control--> IV dilaudid prn for severe pain  -Urology consulted  - stable cr   - remains afebrile. strain     Acute UTI.   ·  Plan: UA appear dirty, though also have squamous cell  Leukocytosis+,  dysuria+, suprapubic tenderness+  right CVA tenderness  Check urine culture, check blood culture  Continue ceftriaxone 1g daily.
34 Y/O female PMHx gallstones, nephrolithiasis hospitalized at [facility] in October for MET, sleeve gastrectomy 2023,  2018 presents c/o right flank pain since last night. CT from  shows mild right hydroureteronephrosis to the level of a 2 mm right distal ureteral calculus.   Afebrile. UTI.       PLAN:   - Continue aggresive rehydration; IVF  - Repeat laboratory studies; trend renal function   - ABx   - Strain urine; monitor urine output; strict I&Os   - continue Flomax 0.8mg  - F/U UCx  Discussed with Dr. Turner.  
32 Y/O female PMHx gallstones, nephrolithiasis hospitalized at [facility] in October for MET, sleeve gastrectomy 2023,  2018 presents c/o right flank pain since last night. CT from  shows mild right hydroureteronephrosis to the level of a 2 mm right distal ureteral calculus.   +UTI, UCx-GNR, pending sensitivities  pt has been straining urine, denies stone but says has passed a lot of "sand"  aferbrile, VS WNL, Creatinine WNL, no white count  Ok for dc from urology standpoint  PO abx, flomax, op f/u with urology   Discussed with Dr Waite

## 2024-02-11 RX ORDER — CIPROFLOXACIN LACTATE 400MG/40ML
1 VIAL (ML) INTRAVENOUS
Qty: 10 | Refills: 0
Start: 2024-02-11 | End: 2024-02-15

## 2024-02-11 NOTE — CHART NOTE - NSCHARTNOTEFT_GEN_A_CORE
reviewed urine cx and it seems its resistant to the discharge abx. I callled the pt and shes denying dysuria or fever but still passing the stone. Will change abx to cipro and pt to follow up with urology this week,

## 2024-02-12 LAB
CULTURE RESULTS: SIGNIFICANT CHANGE UP
CULTURE RESULTS: SIGNIFICANT CHANGE UP
SPECIMEN SOURCE: SIGNIFICANT CHANGE UP
SPECIMEN SOURCE: SIGNIFICANT CHANGE UP

## 2024-02-15 DIAGNOSIS — A41.50 GRAM-NEGATIVE SEPSIS, UNSPECIFIED: ICD-10-CM

## 2024-02-15 DIAGNOSIS — N13.6 PYONEPHROSIS: ICD-10-CM

## 2024-02-15 DIAGNOSIS — Z87.19 PERSONAL HISTORY OF OTHER DISEASES OF THE DIGESTIVE SYSTEM: ICD-10-CM

## 2024-02-15 DIAGNOSIS — Z98.84 BARIATRIC SURGERY STATUS: ICD-10-CM

## 2024-02-15 DIAGNOSIS — Z16.29 RESISTANCE TO OTHER SINGLE SPECIFIED ANTIBIOTIC: ICD-10-CM

## 2024-02-15 DIAGNOSIS — E83.59 OTHER DISORDERS OF CALCIUM METABOLISM: ICD-10-CM
